# Patient Record
Sex: MALE | Race: WHITE | Employment: OTHER | URBAN - METROPOLITAN AREA
[De-identification: names, ages, dates, MRNs, and addresses within clinical notes are randomized per-mention and may not be internally consistent; named-entity substitution may affect disease eponyms.]

---

## 2018-09-28 RX ORDER — AMLODIPINE BESYLATE 5 MG/1
5 TABLET ORAL DAILY
COMMUNITY

## 2018-10-03 ENCOUNTER — ANESTHESIA EVENT (OUTPATIENT)
Dept: OPERATING ROOM | Age: 58
End: 2018-10-03
Payer: MEDICARE

## 2018-10-04 ENCOUNTER — ANESTHESIA (OUTPATIENT)
Dept: OPERATING ROOM | Age: 58
End: 2018-10-04
Payer: MEDICARE

## 2018-10-04 ENCOUNTER — HOSPITAL ENCOUNTER (OUTPATIENT)
Age: 58
Setting detail: OUTPATIENT SURGERY
Discharge: HOME | End: 2018-10-04
Attending: PODIATRIST | Admitting: PODIATRIST
Payer: MEDICARE

## 2018-10-04 VITALS
OXYGEN SATURATION: 99 % | WEIGHT: 225 LBS | HEART RATE: 58 BPM | BODY MASS INDEX: 28.88 KG/M2 | SYSTOLIC BLOOD PRESSURE: 135 MMHG | TEMPERATURE: 97.2 F | RESPIRATION RATE: 18 BRPM | DIASTOLIC BLOOD PRESSURE: 76 MMHG | HEIGHT: 74 IN

## 2018-10-04 VITALS — DIASTOLIC BLOOD PRESSURE: 75 MMHG | SYSTOLIC BLOOD PRESSURE: 120 MMHG | OXYGEN SATURATION: 98 %

## 2018-10-04 DIAGNOSIS — Z96.7 FIXATION HARDWARE IN LOWER EXTREMITY: Primary | ICD-10-CM

## 2018-10-04 LAB
GLUCOSE BLD-MCNC: 162 MG/DL (ref 70–99)
PERFORMED ON: ABNORMAL

## 2018-10-04 PROCEDURE — 3600000014 HC SURGERY LEVEL 4 ADDTL 15MIN: Performed by: PODIATRIST

## 2018-10-04 PROCEDURE — 3600000004 HC SURGERY LEVEL 4 BASE: Performed by: PODIATRIST

## 2018-10-04 PROCEDURE — 7100000000 HC PACU RECOVERY - FIRST 15 MIN: Performed by: PODIATRIST

## 2018-10-04 PROCEDURE — 2580000003 HC RX 258: Performed by: PODIATRIST

## 2018-10-04 PROCEDURE — 6360000002 HC RX W HCPCS: Performed by: NURSE ANESTHETIST, CERTIFIED REGISTERED

## 2018-10-04 PROCEDURE — 2580000003 HC RX 258: Performed by: ANESTHESIOLOGY

## 2018-10-04 PROCEDURE — 6360000002 HC RX W HCPCS: Performed by: PODIATRIST

## 2018-10-04 PROCEDURE — 3700000000 HC ANESTHESIA ATTENDED CARE: Performed by: PODIATRIST

## 2018-10-04 PROCEDURE — 7100000011 HC PHASE II RECOVERY - ADDTL 15 MIN: Performed by: PODIATRIST

## 2018-10-04 PROCEDURE — 3700000001 HC ADD 15 MINUTES (ANESTHESIA): Performed by: PODIATRIST

## 2018-10-04 PROCEDURE — 2709999900 HC NON-CHARGEABLE SUPPLY: Performed by: PODIATRIST

## 2018-10-04 PROCEDURE — 93005 ELECTROCARDIOGRAM TRACING: CPT

## 2018-10-04 PROCEDURE — 7100000010 HC PHASE II RECOVERY - FIRST 15 MIN: Performed by: PODIATRIST

## 2018-10-04 PROCEDURE — 2500000003 HC RX 250 WO HCPCS: Performed by: PODIATRIST

## 2018-10-04 RX ORDER — ONDANSETRON 2 MG/ML
4 INJECTION INTRAMUSCULAR; INTRAVENOUS
Status: DISCONTINUED | OUTPATIENT
Start: 2018-10-04 | End: 2018-10-04 | Stop reason: HOSPADM

## 2018-10-04 RX ORDER — MIDAZOLAM HYDROCHLORIDE 5 MG/ML
INJECTION INTRAMUSCULAR; INTRAVENOUS PRN
Status: DISCONTINUED | OUTPATIENT
Start: 2018-10-04 | End: 2018-10-04 | Stop reason: SDUPTHER

## 2018-10-04 RX ORDER — HYDRALAZINE HYDROCHLORIDE 20 MG/ML
5 INJECTION INTRAMUSCULAR; INTRAVENOUS EVERY 10 MIN PRN
Status: DISCONTINUED | OUTPATIENT
Start: 2018-10-04 | End: 2018-10-04 | Stop reason: HOSPADM

## 2018-10-04 RX ORDER — MORPHINE SULFATE 2 MG/ML
2 INJECTION, SOLUTION INTRAMUSCULAR; INTRAVENOUS EVERY 5 MIN PRN
Status: DISCONTINUED | OUTPATIENT
Start: 2018-10-04 | End: 2018-10-04 | Stop reason: HOSPADM

## 2018-10-04 RX ORDER — OXYCODONE HYDROCHLORIDE AND ACETAMINOPHEN 5; 325 MG/1; MG/1
2 TABLET ORAL PRN
Status: DISCONTINUED | OUTPATIENT
Start: 2018-10-04 | End: 2018-10-04 | Stop reason: HOSPADM

## 2018-10-04 RX ORDER — FENTANYL CITRATE 50 UG/ML
INJECTION, SOLUTION INTRAMUSCULAR; INTRAVENOUS PRN
Status: DISCONTINUED | OUTPATIENT
Start: 2018-10-04 | End: 2018-10-04 | Stop reason: SDUPTHER

## 2018-10-04 RX ORDER — OXYCODONE HYDROCHLORIDE AND ACETAMINOPHEN 5; 325 MG/1; MG/1
1 TABLET ORAL EVERY 6 HOURS PRN
Qty: 12 TABLET | Refills: 0 | Status: SHIPPED | OUTPATIENT
Start: 2018-10-04 | End: 2018-10-07

## 2018-10-04 RX ORDER — LIDOCAINE HYDROCHLORIDE 10 MG/ML
0.3 INJECTION, SOLUTION EPIDURAL; INFILTRATION; INTRACAUDAL; PERINEURAL
Status: DISCONTINUED | OUTPATIENT
Start: 2018-10-04 | End: 2018-10-04 | Stop reason: HOSPADM

## 2018-10-04 RX ORDER — LABETALOL HYDROCHLORIDE 5 MG/ML
5 INJECTION, SOLUTION INTRAVENOUS EVERY 10 MIN PRN
Status: DISCONTINUED | OUTPATIENT
Start: 2018-10-04 | End: 2018-10-04 | Stop reason: HOSPADM

## 2018-10-04 RX ORDER — MORPHINE SULFATE 2 MG/ML
1 INJECTION, SOLUTION INTRAMUSCULAR; INTRAVENOUS EVERY 5 MIN PRN
Status: DISCONTINUED | OUTPATIENT
Start: 2018-10-04 | End: 2018-10-04 | Stop reason: HOSPADM

## 2018-10-04 RX ORDER — PROPOFOL 10 MG/ML
INJECTION, EMULSION INTRAVENOUS PRN
Status: DISCONTINUED | OUTPATIENT
Start: 2018-10-04 | End: 2018-10-04 | Stop reason: SDUPTHER

## 2018-10-04 RX ORDER — OXYCODONE HYDROCHLORIDE AND ACETAMINOPHEN 5; 325 MG/1; MG/1
1 TABLET ORAL PRN
Status: DISCONTINUED | OUTPATIENT
Start: 2018-10-04 | End: 2018-10-04 | Stop reason: HOSPADM

## 2018-10-04 RX ORDER — DIPHENHYDRAMINE HYDROCHLORIDE 50 MG/ML
12.5 INJECTION INTRAMUSCULAR; INTRAVENOUS
Status: DISCONTINUED | OUTPATIENT
Start: 2018-10-04 | End: 2018-10-04 | Stop reason: HOSPADM

## 2018-10-04 RX ORDER — SODIUM CHLORIDE, SODIUM LACTATE, POTASSIUM CHLORIDE, CALCIUM CHLORIDE 600; 310; 30; 20 MG/100ML; MG/100ML; MG/100ML; MG/100ML
INJECTION, SOLUTION INTRAVENOUS CONTINUOUS
Status: DISCONTINUED | OUTPATIENT
Start: 2018-10-04 | End: 2018-10-04 | Stop reason: HOSPADM

## 2018-10-04 RX ORDER — PROMETHAZINE HYDROCHLORIDE 25 MG/ML
6.25 INJECTION, SOLUTION INTRAMUSCULAR; INTRAVENOUS
Status: DISCONTINUED | OUTPATIENT
Start: 2018-10-04 | End: 2018-10-04 | Stop reason: HOSPADM

## 2018-10-04 RX ORDER — MEPERIDINE HYDROCHLORIDE 50 MG/ML
12.5 INJECTION INTRAMUSCULAR; INTRAVENOUS; SUBCUTANEOUS EVERY 5 MIN PRN
Status: DISCONTINUED | OUTPATIENT
Start: 2018-10-04 | End: 2018-10-04 | Stop reason: HOSPADM

## 2018-10-04 RX ORDER — SODIUM CHLORIDE 0.9 % (FLUSH) 0.9 %
10 SYRINGE (ML) INJECTION PRN
Status: DISCONTINUED | OUTPATIENT
Start: 2018-10-04 | End: 2018-10-04 | Stop reason: HOSPADM

## 2018-10-04 RX ORDER — SODIUM CHLORIDE 0.9 % (FLUSH) 0.9 %
10 SYRINGE (ML) INJECTION EVERY 12 HOURS SCHEDULED
Status: DISCONTINUED | OUTPATIENT
Start: 2018-10-04 | End: 2018-10-04 | Stop reason: HOSPADM

## 2018-10-04 RX ORDER — MAGNESIUM HYDROXIDE 1200 MG/15ML
LIQUID ORAL CONTINUOUS PRN
Status: DISCONTINUED | OUTPATIENT
Start: 2018-10-04 | End: 2018-10-04 | Stop reason: HOSPADM

## 2018-10-04 RX ADMIN — SODIUM CHLORIDE, POTASSIUM CHLORIDE, SODIUM LACTATE AND CALCIUM CHLORIDE: 600; 310; 30; 20 INJECTION, SOLUTION INTRAVENOUS at 07:25

## 2018-10-04 RX ADMIN — FENTANYL CITRATE 25 MCG: 50 INJECTION INTRAMUSCULAR; INTRAVENOUS at 08:04

## 2018-10-04 RX ADMIN — Medication 1.5 G: at 07:25

## 2018-10-04 RX ADMIN — PROPOFOL 100 MG: 10 INJECTION, EMULSION INTRAVENOUS at 08:04

## 2018-10-04 RX ADMIN — MIDAZOLAM HYDROCHLORIDE 5 MG: 5 INJECTION INTRAMUSCULAR; INTRAVENOUS at 08:04

## 2018-10-04 ASSESSMENT — PAIN SCALES - GENERAL
PAINLEVEL_OUTOF10: 0
PAINLEVEL_OUTOF10: 0

## 2018-10-04 ASSESSMENT — PULMONARY FUNCTION TESTS
PIF_VALUE: 1
PIF_VALUE: 0
PIF_VALUE: 1
PIF_VALUE: 0
PIF_VALUE: 0
PIF_VALUE: 2
PIF_VALUE: 0
PIF_VALUE: 2
PIF_VALUE: 1
PIF_VALUE: 1
PIF_VALUE: 0
PIF_VALUE: 1
PIF_VALUE: 0
PIF_VALUE: 1
PIF_VALUE: 1
PIF_VALUE: 0
PIF_VALUE: 2
PIF_VALUE: 0
PIF_VALUE: 1
PIF_VALUE: 0

## 2018-10-04 ASSESSMENT — PAIN - FUNCTIONAL ASSESSMENT: PAIN_FUNCTIONAL_ASSESSMENT: 0-10

## 2018-10-04 NOTE — ANESTHESIA PRE PROCEDURE
 Lisinopril Itching    Penicillins Rash       Problem List:    Patient Active Problem List   Diagnosis Code    Acute renal failure (HCC) N17.9    Altered mental status R41.82    Calculus of kidney N20.0    Skin sensation disturbance R20.9    Lumbosacral radiculopathy M54.17    Pain in extremity M79.609    Tarsal tunnel syndrome G57.50    Pain in testicle N50.819    Hereditary and idiopathic peripheral neuropathy G60.9    Gastrointestinal hemorrhage K92.2    Hyperglycemia R73.9    Elevation of level of transaminase or lactic acid dehydrogenase (LDH) R74.0       Past Medical History:        Diagnosis Date    [facility]     [facility]-fib [facility]     Arthritis     CAD (coronary artery disease)     a fib    [facility]     Diverticulosis     Hypertension     Neuropathy     feet       Past Surgical History:        Procedure Laterality Date    CHOLECYSTECTOMY      FOOT SURGERY      HAND SURGERY      KNEE ARTHROSCOPY      NOSE SURGERY      SHOULDER ARTHROSCOPY Left 3/21/16    SAD, Debridement, GASTON, Open BT       Social History:    Social History   Substance Use Topics    Smoking status: Never Smoker    Smokeless tobacco: Never Used    Alcohol use 14.4 oz/week     24 Cans of beer per week                                Counseling given: Not Answered      Vital Signs (Current):   Vitals:    09/28/18 0923 10/04/18 0713   BP:  (!) 157/94   Pulse:  62   Resp:  18   Temp:  97.3 °F (36.3 °C)   TempSrc:  Temporal   SpO2:  98%   Weight: 225 lb (102.1 kg) 225 lb (102.1 kg)   Height: 6' 2\" (1.88 m) 6' 2\" (1.88 m)                                              BP Readings from Last 3 Encounters:   10/04/18 (!) 157/94   06/16/16 133/85   05/05/16 102/78       NPO Status:                                                                                 BMI:   Wt Readings from Last 3 Encounters:   10/04/18 225 lb (102.1 kg)   06/16/16 225 lb (102.1 kg)   05/05/16 225 lb (102.1 kg)     Body mass index is 28.89 kg/m². CBC: No results found for: WBC, RBC, HGB, HCT, MCV, RDW, PLT    CMP:   Lab Results   Component Value Date    NA CANCEL 06/22/2015    K CANCEL 06/22/2015    CL CANCEL 06/22/2015    CO2 CANCEL 06/22/2015    BUN CANCEL 06/22/2015    CREATININE CANCEL 06/22/2015    GFRAA CANCEL 06/22/2015    GLUCOSE 159 06/22/2015    CALCIUM CANCEL 06/22/2015       POC Tests: No results for input(s): POCGLU, POCNA, POCK, POCCL, POCBUN, POCHEMO, POCHCT in the last 72 hours. Coags:   Lab Results   Component Value Date    PROTIME CANCEL 06/22/2015    INR CANCEL 06/22/2015       HCG (If Applicable): No results found for: PREGTESTUR, PREGSERUM, HCG, HCGQUANT     ABGs: No results found for: PHART, PO2ART, GQV8VZE, VPN3ABD, BEART, B5VRSFRD     Type & Screen (If Applicable):  No results found for: LABABO, LABRH    Anesthesia Evaluation   no history of anesthetic complications:   Airway: Mallampati: III  TM distance: <3 FB   Neck ROM: limited  Mouth opening: > = 3 FB Dental:          Pulmonary:Negative Pulmonary ROS                              Cardiovascular:    (+) hypertension:, CAD:, dysrhythmias: atrial fibrillation,          Beta Blocker:  Dose within 24 Hrs         Neuro/Psych:   (+) neuromuscular disease:,              ROS comment: PERIPHERAL NEUROPATHY GI/Hepatic/Renal:   (+) renal disease: kidney stones,           Endo/Other:    (+) DiabetesType II DM, , : arthritis: OA., .                 Abdominal:           Vascular:                                     Pre-Operative Diagnosis: Displacement of internal fixation device of bone of right lower leg, subsequent encounter [T84.126D]    59 y.o.   BMI:  Body mass index is 28.89 kg/m².      Vitals:    09/28/18 0923 10/04/18 0713   BP:  (!) 157/94   Pulse:  62   Resp:  18   Temp:  97.3 °F (36.3 °C)   TempSrc:  Temporal   SpO2:  98%   Weight: 225 lb (102.1 kg) 225 lb (102.1 kg)   Height: 6' 2\" (1.88 m) 6' 2\" (1.88 m)       Allergies   Allergen Reactions    Lisinopril Itching

## 2018-10-05 LAB
EKG ATRIAL RATE: 326 BPM
EKG DIAGNOSIS: NORMAL
EKG Q-T INTERVAL: 410 MS
EKG QRS DURATION: 78 MS
EKG QTC CALCULATION (BAZETT): 429 MS
EKG R AXIS: -4 DEGREES
EKG T AXIS: 2 DEGREES
EKG VENTRICULAR RATE: 66 BPM

## 2018-12-17 NOTE — PROGRESS NOTES
Armani Kayley    Age 59 y.o.    male    1960    MRN [id number]    Date___________   Arrival Time_____________  OR Time____________Duration____     Procedure(s):  REMOVAL OF  SCREW  RIGHT HALLUX  PROXIMAL PHALANX OSTEOTOMY WITHOUT FIXATION RIGHT FOOT    Surgeon  ________________________________  [facility]   General   [place]       Phone [phone] (home)       Initals  Date  [address]  Cell Work  ______________________________________________________________________________________________________________________________________________________________________________________________________________________________________________________________________________________________________________________________________________________________    PCP__________________________Phone__________________________________      H&P__________________Bringing      Chart              Epic    DOS           Called_______  EKG__________________Bringing      Chart              Epic    DOS           Called_______  LAB__________________ Bringing      Chart              Epic    DOS           Called_______  CardiacClearance _______Bringing      Chart              Epic    DOS           Called_______      Cardiologist________________________ Phone___________________________      ? Zoroastrianism concerns / Waiver on Chart            [employer]________________  ? Pre-op Instructions Given [place]          _________________________________  ? Directions to Surgery Center                          _________________________________  ? Transportation Home_______________      _________________________________  ?  Crutches/Walker__________________        _________________________________      ________Pre-op Orders   _______Transcribed    _______Wt.  ________Pharmacy          _______SCD  ______VTE     ______Beta Blocker  ________Consent

## 2018-12-26 ENCOUNTER — ANESTHESIA EVENT (OUTPATIENT)
Dept: OPERATING ROOM | Age: 58
End: 2018-12-26
Payer: MEDICARE

## 2018-12-27 ENCOUNTER — HOSPITAL ENCOUNTER (OUTPATIENT)
Age: 58
Setting detail: OUTPATIENT SURGERY
Discharge: HOME | End: 2018-12-27
Attending: PODIATRIST | Admitting: PODIATRIST
Payer: MEDICARE

## 2018-12-27 ENCOUNTER — ANESTHESIA (OUTPATIENT)
Dept: OPERATING ROOM | Age: 58
End: 2018-12-27
Payer: MEDICARE

## 2018-12-27 VITALS — SYSTOLIC BLOOD PRESSURE: 138 MMHG | OXYGEN SATURATION: 96 % | DIASTOLIC BLOOD PRESSURE: 99 MMHG

## 2018-12-27 VITALS
DIASTOLIC BLOOD PRESSURE: 93 MMHG | HEART RATE: 72 BPM | TEMPERATURE: 97.2 F | RESPIRATION RATE: 14 BRPM | WEIGHT: 225 LBS | BODY MASS INDEX: 28.88 KG/M2 | SYSTOLIC BLOOD PRESSURE: 127 MMHG | OXYGEN SATURATION: 96 % | HEIGHT: 74 IN

## 2018-12-27 DIAGNOSIS — M25.774 OSTEOPHYTE OF RIGHT FOOT: Primary | ICD-10-CM

## 2018-12-27 LAB
EKG ATRIAL RATE: 66 BPM
EKG DIAGNOSIS: NORMAL
EKG Q-T INTERVAL: 412 MS
EKG QRS DURATION: 82 MS
EKG QTC CALCULATION (BAZETT): 438 MS
EKG R AXIS: 1 DEGREES
EKG T AXIS: -6 DEGREES
EKG VENTRICULAR RATE: 68 BPM
GLUCOSE BLD-MCNC: 235 MG/DL (ref 70–99)
GLUCOSE BLD-MCNC: 288 MG/DL (ref 70–99)
PERFORMED ON: ABNORMAL
PERFORMED ON: ABNORMAL

## 2018-12-27 PROCEDURE — 6360000002 HC RX W HCPCS: Performed by: NURSE ANESTHETIST, CERTIFIED REGISTERED

## 2018-12-27 PROCEDURE — 2580000003 HC RX 258: Performed by: ANESTHESIOLOGY

## 2018-12-27 PROCEDURE — 7100000011 HC PHASE II RECOVERY - ADDTL 15 MIN: Performed by: PODIATRIST

## 2018-12-27 PROCEDURE — 93010 ELECTROCARDIOGRAM REPORT: CPT | Performed by: INTERNAL MEDICINE

## 2018-12-27 PROCEDURE — 3700000000 HC ANESTHESIA ATTENDED CARE: Performed by: PODIATRIST

## 2018-12-27 PROCEDURE — 6360000002 HC RX W HCPCS: Performed by: PODIATRIST

## 2018-12-27 PROCEDURE — 2500000003 HC RX 250 WO HCPCS: Performed by: NURSE ANESTHETIST, CERTIFIED REGISTERED

## 2018-12-27 PROCEDURE — 2709999900 HC NON-CHARGEABLE SUPPLY: Performed by: PODIATRIST

## 2018-12-27 PROCEDURE — 7100000010 HC PHASE II RECOVERY - FIRST 15 MIN: Performed by: PODIATRIST

## 2018-12-27 PROCEDURE — 93005 ELECTROCARDIOGRAM TRACING: CPT | Performed by: ANESTHESIOLOGY

## 2018-12-27 PROCEDURE — 3600000004 HC SURGERY LEVEL 4 BASE: Performed by: PODIATRIST

## 2018-12-27 PROCEDURE — 3700000001 HC ADD 15 MINUTES (ANESTHESIA): Performed by: PODIATRIST

## 2018-12-27 PROCEDURE — 3600000014 HC SURGERY LEVEL 4 ADDTL 15MIN: Performed by: PODIATRIST

## 2018-12-27 RX ORDER — MIDAZOLAM HYDROCHLORIDE 1 MG/ML
INJECTION INTRAMUSCULAR; INTRAVENOUS PRN
Status: DISCONTINUED | OUTPATIENT
Start: 2018-12-27 | End: 2018-12-27 | Stop reason: SDUPTHER

## 2018-12-27 RX ORDER — FENTANYL CITRATE 50 UG/ML
INJECTION, SOLUTION INTRAMUSCULAR; INTRAVENOUS PRN
Status: DISCONTINUED | OUTPATIENT
Start: 2018-12-27 | End: 2018-12-27 | Stop reason: SDUPTHER

## 2018-12-27 RX ORDER — LIDOCAINE HYDROCHLORIDE 10 MG/ML
1 INJECTION, SOLUTION EPIDURAL; INFILTRATION; INTRACAUDAL; PERINEURAL
Status: DISCONTINUED | OUTPATIENT
Start: 2018-12-27 | End: 2018-12-27 | Stop reason: HOSPADM

## 2018-12-27 RX ORDER — SODIUM CHLORIDE, SODIUM LACTATE, POTASSIUM CHLORIDE, CALCIUM CHLORIDE 600; 310; 30; 20 MG/100ML; MG/100ML; MG/100ML; MG/100ML
INJECTION, SOLUTION INTRAVENOUS CONTINUOUS
Status: DISCONTINUED | OUTPATIENT
Start: 2018-12-27 | End: 2018-12-27 | Stop reason: HOSPADM

## 2018-12-27 RX ORDER — LIDOCAINE HYDROCHLORIDE 20 MG/ML
INJECTION, SOLUTION INFILTRATION; PERINEURAL PRN
Status: DISCONTINUED | OUTPATIENT
Start: 2018-12-27 | End: 2018-12-27 | Stop reason: SDUPTHER

## 2018-12-27 RX ORDER — PROPOFOL 10 MG/ML
INJECTION, EMULSION INTRAVENOUS PRN
Status: DISCONTINUED | OUTPATIENT
Start: 2018-12-27 | End: 2018-12-27 | Stop reason: SDUPTHER

## 2018-12-27 RX ORDER — OXYCODONE HYDROCHLORIDE AND ACETAMINOPHEN 5; 325 MG/1; MG/1
1 TABLET ORAL EVERY 6 HOURS PRN
Qty: 20 TABLET | Refills: 0 | Status: SHIPPED | OUTPATIENT
Start: 2018-12-27 | End: 2019-01-01

## 2018-12-27 RX ORDER — SODIUM CHLORIDE 0.9 % (FLUSH) 0.9 %
10 SYRINGE (ML) INJECTION PRN
Status: DISCONTINUED | OUTPATIENT
Start: 2018-12-27 | End: 2018-12-27 | Stop reason: HOSPADM

## 2018-12-27 RX ORDER — SODIUM CHLORIDE 0.9 % (FLUSH) 0.9 %
10 SYRINGE (ML) INJECTION EVERY 12 HOURS SCHEDULED
Status: DISCONTINUED | OUTPATIENT
Start: 2018-12-27 | End: 2018-12-27 | Stop reason: HOSPADM

## 2018-12-27 RX ADMIN — MIDAZOLAM HYDROCHLORIDE 2 MG: 2 INJECTION, SOLUTION INTRAMUSCULAR; INTRAVENOUS at 09:43

## 2018-12-27 RX ADMIN — PROPOFOL 30 MG: 10 INJECTION, EMULSION INTRAVENOUS at 10:20

## 2018-12-27 RX ADMIN — FENTANYL CITRATE 100 MCG: 50 INJECTION INTRAMUSCULAR; INTRAVENOUS at 09:43

## 2018-12-27 RX ADMIN — PROPOFOL 20 MG: 10 INJECTION, EMULSION INTRAVENOUS at 10:30

## 2018-12-27 RX ADMIN — PROPOFOL 30 MG: 10 INJECTION, EMULSION INTRAVENOUS at 10:07

## 2018-12-27 RX ADMIN — Medication 1.5 G: at 09:42

## 2018-12-27 RX ADMIN — Medication 1.5 G: at 08:29

## 2018-12-27 RX ADMIN — SODIUM CHLORIDE, POTASSIUM CHLORIDE, SODIUM LACTATE AND CALCIUM CHLORIDE: 600; 310; 30; 20 INJECTION, SOLUTION INTRAVENOUS at 08:32

## 2018-12-27 RX ADMIN — LIDOCAINE HYDROCHLORIDE 50 MG: 20 INJECTION, SOLUTION INFILTRATION; PERINEURAL at 09:46

## 2018-12-27 RX ADMIN — PROPOFOL 100 MG: 10 INJECTION, EMULSION INTRAVENOUS at 09:46

## 2018-12-27 RX ADMIN — MIDAZOLAM HYDROCHLORIDE 2 MG: 2 INJECTION, SOLUTION INTRAMUSCULAR; INTRAVENOUS at 10:34

## 2018-12-27 RX ADMIN — PROPOFOL 20 MG: 10 INJECTION, EMULSION INTRAVENOUS at 10:16

## 2018-12-27 RX ADMIN — FENTANYL CITRATE 100 MCG: 50 INJECTION INTRAMUSCULAR; INTRAVENOUS at 10:34

## 2018-12-27 ASSESSMENT — PAIN SCALES - GENERAL
PAINLEVEL_OUTOF10: 0

## 2018-12-27 ASSESSMENT — PULMONARY FUNCTION TESTS
PIF_VALUE: 0
PIF_VALUE: 1
PIF_VALUE: 0
PIF_VALUE: 1
PIF_VALUE: 0
PIF_VALUE: 1
PIF_VALUE: 0
PIF_VALUE: 1
PIF_VALUE: 0
PIF_VALUE: 1
PIF_VALUE: 0
PIF_VALUE: 0

## 2018-12-27 ASSESSMENT — PAIN - FUNCTIONAL ASSESSMENT: PAIN_FUNCTIONAL_ASSESSMENT: 0-10

## 2018-12-27 NOTE — ANESTHESIA PRE PROCEDURE
Department of Anesthesiology  Preprocedure Note       Name:  Teresa Bardales   Age:  59 y.o.  :  1960                                          MRN:  [id number]         Date:  2018      Surgeon: Margaret Edmondson):  Reema Olmstead DPM    Procedure: REMOVAL OF  SCREW  RIGHT HALLUX (Right Foot)  PROXIMAL PHALANX OSTECTOMY WITHOUT FIXATION RIGHT FOOT (Right Foot)    Medications prior to admission:   Prior to Admission medications    Medication Sig Start Date End Date Taking? Authorizing Provider   oxyCODONE-acetaminophen (PERCOCET) 5-325 MG per tablet Take 1 tablet by mouth every 6 hours as needed for Pain for up to 5 days. Intended supply: 5 days. Take lowest dose possible to manage pain. Earliest Fill Date: 18 Yes Reema Olmstead DPM   DULoxetine (CYMBALTA) 60 MG capsule Take 120 mg by mouth daily    Yes Historical Provider, MD   gabapentin (NEURONTIN) 800 MG tablet Take 800 mg by mouth 4 times daily.  .   Yes Historical Provider, MD   atenolol (TENORMIN) 50 MG tablet Take 50 mg by mouth daily    Yes Historical Provider, MD   sitaGLIPtin (JANUVIA) 50 MG tablet Take 100 mg by mouth daily    Yes Historical Provider, MD   amLODIPine (NORVASC) 5 MG tablet Take 5 mg by mouth daily    Historical Provider, MD   apixaban (ELIQUIS) 5 MG TABS tablet Take by mouth 2 times daily    Historical Provider, MD   cyclobenzaprine (FLEXERIL) 5 MG tablet Take 5 mg by mouth as needed     Historical Provider, MD       Current medications:    Current Facility-Administered Medications   Medication Dose Route Frequency Provider Last Rate Last Dose    lactated ringers infusion   Intravenous Continuous Lisandro Kaur  mL/hr at 18 7443      sodium chloride flush 0.9 % injection 10 mL  10 mL Intravenous 2 times per day Lisandro Kaur MD        sodium chloride flush 0.9 % injection 10 mL  10 mL Intravenous PRN Lisandro Kaur MD        lidocaine PF 1 % injection 1 mL  1 mL Intradermal Once PRN Keren Liriano MD        bupivacaine (MARCAINE) 5 mL, lidocaine PF 1 % 5 mL    PRN Zoey Cardenas DPM   10 mL at 12/27/18 1006       Allergies: Allergies   Allergen Reactions    Amlodipine      Leg swelling    Lisinopril Itching     Rash?  Tamsulosin Itching     Itchy rash.     Penicillins Rash       Problem List:    Patient Active Problem List   Diagnosis Code    Acute renal failure (HCC) N17.9    Altered mental status R41.82    Calculus of kidney N20.0    Skin sensation disturbance R20.9    Lumbosacral radiculopathy M54.17    Pain in extremity M79.609    Tarsal tunnel syndrome G57.50    Pain in testicle N50.819    Hereditary and idiopathic peripheral neuropathy G60.9    Gastrointestinal hemorrhage K92.2    Hyperglycemia R73.9    Elevation of level of transaminase or lactic acid dehydrogenase (LDH) R74.0       Past Medical History:        Diagnosis Date    [facility]     Arthritis     CAD (coronary artery disease)     a fib    Diabetes mellitus ([facility]     Diverticulosis     Hypertension     Neuropathy     feet    Sleep apnea     uses CPAP       Past Surgical History:        Procedure Laterality Date    CHOLECYSTECTOMY      FOOT SURGERY Right     X 3    HAND SURGERY      KNEE ARTHROSCOPY      NOSE SURGERY      WI REMOVAL DEEP IMPLANT Right 10/4/2018    FOOT HARDWARE REMOVAL RIGHT HALLUX, 2 SCREWS--SLEEP APNEA performed by Zoey Cardenas DPM at [place] [address] ARTHROSCOPY Left 3/21/16    SAD, Debridement, GASTON, Open BT       Social History:    Social History   Substance Use Topics    Smoking status: Never Smoker    Smokeless tobacco: Never Used    Alcohol use 14.4 oz/week     24 Cans of beer per week                                Counseling given: Not Answered      Vital Signs (Current):   Vitals:    12/27/18 1055 12/27/18 1100 12/27/18 1105 12/27/18 1110   BP: (!) 140/121 127/87 (!) 144/82 134/81   Pulse: 73 70 70 62   Resp: 14 15 15 14   Temp:  97.2 °F (36.2 °C)

## 2021-07-19 ENCOUNTER — HOSPITAL ENCOUNTER (OUTPATIENT)
Age: 61
Discharge: HOME | End: 2021-07-19
Payer: MEDICARE

## 2021-07-19 ENCOUNTER — HOSPITAL ENCOUNTER (OUTPATIENT)
Dept: VASCULAR LAB | Age: 61
Discharge: HOME | End: 2021-07-19
Payer: MEDICARE

## 2021-07-19 DIAGNOSIS — I70.25 ATHEROSCLEROSIS OF NATIVE ARTERY OF EXTREMITY WITH ULCERATION, UNSPECIFIED EXTREMITY (HCC): ICD-10-CM

## 2021-07-19 LAB
BASOPHILS ABSOLUTE: 0 K/UL (ref 0–0.2)
BASOPHILS RELATIVE PERCENT: 0.5 %
C-REACTIVE PROTEIN: 3.9 MG/L (ref 0–5.1)
EOSINOPHILS ABSOLUTE: 0.2 K/UL (ref 0–0.6)
EOSINOPHILS RELATIVE PERCENT: 4.2 %
HCT VFR BLD CALC: 40.5 % (ref 40.5–52.5)
HEMOGLOBIN: 14.2 G/DL (ref 13.5–17.5)
LYMPHOCYTES ABSOLUTE: 1.3 K/UL (ref 1–5.1)
LYMPHOCYTES RELATIVE PERCENT: 22 %
MCH RBC QN AUTO: 33.5 PG (ref 26–34)
MCHC RBC AUTO-ENTMCNC: 35.1 G/DL (ref 31–36)
MCV RBC AUTO: 95.5 FL (ref 80–100)
MONOCYTES ABSOLUTE: 0.6 K/UL (ref 0–1.3)
MONOCYTES RELATIVE PERCENT: 10.6 %
NEUTROPHILS ABSOLUTE: 3.7 K/UL (ref 1.7–7.7)
NEUTROPHILS RELATIVE PERCENT: 62.7 %
PDW BLD-RTO: 14.3 % (ref 12.4–15.4)
PLATELET # BLD: 161 K/UL (ref 135–450)
PMV BLD AUTO: 8.6 FL (ref 5–10.5)
RBC # BLD: 4.24 M/UL (ref 4.2–5.9)
SEDIMENTATION RATE, ERYTHROCYTE: 16 MM/HR (ref 0–20)
WBC # BLD: 5.9 K/UL (ref 4–11)

## 2021-07-19 PROCEDURE — 85652 RBC SED RATE AUTOMATED: CPT

## 2021-07-19 PROCEDURE — 93925 LOWER EXTREMITY STUDY: CPT

## 2021-07-19 PROCEDURE — 86140 C-REACTIVE PROTEIN: CPT

## 2021-07-19 PROCEDURE — 85025 COMPLETE CBC W/AUTO DIFF WBC: CPT

## 2021-07-19 PROCEDURE — 36415 COLL VENOUS BLD VENIPUNCTURE: CPT

## 2023-09-28 ENCOUNTER — OFFICE VISIT (OUTPATIENT)
Dept: PAIN MANAGEMENT | Age: 63
End: 2023-09-28
Payer: MEDICARE

## 2023-09-28 VITALS
WEIGHT: 212 LBS | SYSTOLIC BLOOD PRESSURE: 138 MMHG | DIASTOLIC BLOOD PRESSURE: 81 MMHG | OXYGEN SATURATION: 100 % | HEART RATE: 40 BPM | BODY MASS INDEX: 27.22 KG/M2

## 2023-09-28 DIAGNOSIS — M17.0 PRIMARY OSTEOARTHRITIS OF BOTH KNEES: ICD-10-CM

## 2023-09-28 DIAGNOSIS — M96.1 FAILED BACK SURGICAL SYNDROME: ICD-10-CM

## 2023-09-28 DIAGNOSIS — M79.671 PAIN IN RIGHT FOOT: ICD-10-CM

## 2023-09-28 DIAGNOSIS — F19.10 SUBSTANCE ABUSE (HCC): ICD-10-CM

## 2023-09-28 DIAGNOSIS — F51.01 PRIMARY INSOMNIA: ICD-10-CM

## 2023-09-28 DIAGNOSIS — M79.18 MYOFASCIAL PAIN: ICD-10-CM

## 2023-09-28 DIAGNOSIS — G89.4 CHRONIC PAIN SYNDROME: ICD-10-CM

## 2023-09-28 PROCEDURE — G8427 DOCREV CUR MEDS BY ELIG CLIN: HCPCS | Performed by: INTERNAL MEDICINE

## 2023-09-28 PROCEDURE — 3017F COLORECTAL CA SCREEN DOC REV: CPT | Performed by: INTERNAL MEDICINE

## 2023-09-28 PROCEDURE — 1123F ACP DISCUSS/DSCN MKR DOCD: CPT | Performed by: INTERNAL MEDICINE

## 2023-09-28 PROCEDURE — 99204 OFFICE O/P NEW MOD 45 MIN: CPT | Performed by: INTERNAL MEDICINE

## 2023-09-28 PROCEDURE — 4004F PT TOBACCO SCREEN RCVD TLK: CPT | Performed by: INTERNAL MEDICINE

## 2023-09-28 PROCEDURE — G8419 CALC BMI OUT NRM PARAM NOF/U: HCPCS | Performed by: INTERNAL MEDICINE

## 2023-09-28 RX ORDER — VALSARTAN 320 MG/1
320 TABLET ORAL DAILY
COMMUNITY

## 2023-09-28 RX ORDER — ATORVASTATIN CALCIUM 20 MG/1
20 TABLET, FILM COATED ORAL DAILY
COMMUNITY

## 2023-09-28 RX ORDER — CYCLOSPORINE 0.5 MG/ML
1 EMULSION OPHTHALMIC 2 TIMES DAILY
COMMUNITY

## 2024-01-25 ENCOUNTER — OFFICE VISIT (OUTPATIENT)
Dept: ORTHOPEDIC SURGERY | Age: 64
End: 2024-01-25
Payer: MEDICARE

## 2024-01-25 VITALS — BODY MASS INDEX: 27.22 KG/M2 | HEIGHT: 74 IN

## 2024-01-25 DIAGNOSIS — M19.012 PRIMARY OSTEOARTHRITIS OF LEFT SHOULDER: ICD-10-CM

## 2024-01-25 DIAGNOSIS — M19.019 AC JOINT ARTHROPATHY: ICD-10-CM

## 2024-01-25 DIAGNOSIS — M25.512 LEFT SHOULDER PAIN, UNSPECIFIED CHRONICITY: Primary | ICD-10-CM

## 2024-01-25 PROCEDURE — G8484 FLU IMMUNIZE NO ADMIN: HCPCS | Performed by: ORTHOPAEDIC SURGERY

## 2024-01-25 PROCEDURE — 1123F ACP DISCUSS/DSCN MKR DOCD: CPT | Performed by: ORTHOPAEDIC SURGERY

## 2024-01-25 PROCEDURE — 99203 OFFICE O/P NEW LOW 30 MIN: CPT | Performed by: ORTHOPAEDIC SURGERY

## 2024-01-25 PROCEDURE — 20610 DRAIN/INJ JOINT/BURSA W/O US: CPT | Performed by: ORTHOPAEDIC SURGERY

## 2024-01-25 PROCEDURE — G8428 CUR MEDS NOT DOCUMENT: HCPCS | Performed by: ORTHOPAEDIC SURGERY

## 2024-01-25 PROCEDURE — G8419 CALC BMI OUT NRM PARAM NOF/U: HCPCS | Performed by: ORTHOPAEDIC SURGERY

## 2024-01-25 PROCEDURE — 3017F COLORECTAL CA SCREEN DOC REV: CPT | Performed by: ORTHOPAEDIC SURGERY

## 2024-01-25 PROCEDURE — 1036F TOBACCO NON-USER: CPT | Performed by: ORTHOPAEDIC SURGERY

## 2024-01-25 RX ORDER — TRAMADOL HYDROCHLORIDE 50 MG/1
50 TABLET ORAL NIGHTLY PRN
Qty: 30 TABLET | Refills: 0 | Status: SHIPPED | OUTPATIENT
Start: 2024-01-25 | End: 2024-02-24

## 2024-01-25 RX ORDER — VALSARTAN AND HYDROCHLOROTHIAZIDE 320; 25 MG/1; MG/1
1 TABLET, FILM COATED ORAL DAILY
COMMUNITY
Start: 2020-09-23

## 2024-01-25 RX ORDER — LIDOCAINE HYDROCHLORIDE 10 MG/ML
8 INJECTION, SOLUTION INFILTRATION; PERINEURAL ONCE
Status: COMPLETED | OUTPATIENT
Start: 2024-01-25 | End: 2024-01-25

## 2024-01-25 RX ORDER — METHYLPREDNISOLONE ACETATE 40 MG/ML
80 INJECTION, SUSPENSION INTRA-ARTICULAR; INTRALESIONAL; INTRAMUSCULAR; SOFT TISSUE ONCE
Status: COMPLETED | OUTPATIENT
Start: 2024-01-25 | End: 2024-01-25

## 2024-01-25 RX ORDER — METFORMIN HYDROCHLORIDE 500 MG/1
1000 TABLET, EXTENDED RELEASE ORAL EVERY MORNING
COMMUNITY

## 2024-01-25 RX ORDER — SEMAGLUTIDE 1.34 MG/ML
INJECTION, SOLUTION SUBCUTANEOUS
COMMUNITY
Start: 2023-09-05

## 2024-01-25 RX ADMIN — METHYLPREDNISOLONE ACETATE 80 MG: 40 INJECTION, SUSPENSION INTRA-ARTICULAR; INTRALESIONAL; INTRAMUSCULAR; SOFT TISSUE at 13:38

## 2024-01-25 RX ADMIN — LIDOCAINE HYDROCHLORIDE 8 ML: 10 INJECTION, SOLUTION INFILTRATION; PERINEURAL at 13:36

## 2024-01-25 NOTE — PROGRESS NOTES
[facility]  History and Physical  Shoulder Pain    Date:  2024    Name:  Facundo Hartley  Address:  [address] [place] [address]  [place] [address]    :  1960      Age:   63 y.o.    SSN:  [id number]      Medical Record Number:  [id number]    Reason for Visit:    Shoulder Pain (OP/NP LEFT SHOULDER)      HPI:   Facundo Hartley is a 63 y.o. male who presents to our office today complaining of  left shoulder pain.  He is an old patient of ours but has not been seen since 2016.  He is a retired  who had a left arthroscopic subacromial decompression with open biceps tenodesis on 3/21/2016.  This patient has done really well following his surgery and was able to return back to his former job.  More recently, 3 months ago he began having increasing pain in his left shoulder.  He does have pain that goes down his arm.  He denies any specific injuries to the shoulder.  He is quite active and continues to work on things around the house as well as going hunting.    Pain Assessment  Location of Pain: Shoulder  Location Modifiers: Left  Severity of Pain: 8  Quality of Pain: Aching, Dull, Sharp, Throbbing  Duration of Pain: Persistent  Frequency of Pain: Constant  Aggravating Factors: Other (Comment), Straightening, Stretching, Exercise  Limiting Behavior: Yes  Work-Related Injury: No  Are there other pain locations you wish to document?: No    Review of Systems:  A 14 point review of systems available in the scanned medical record as documented by the patient.  The review is negative with the exception of those things mentioned in the History of Present Illness and Past Medical History.      Past History:  Past Medical History:   Diagnosis Date    A-fib (HCC)     Arthritis     CAD (coronary artery disease)     a fib    Diabetes mellitus (HCC)     Diverticulosis     Hypertension     Neuropathy     feet    Sleep apnea     uses CPAP     Past Surgical History:

## 2024-01-29 ENCOUNTER — EVALUATION (OUTPATIENT)
Dept: PHYSICAL THERAPY | Age: 64
End: 2024-01-29

## 2024-01-29 DIAGNOSIS — M25.512 LEFT SHOULDER PAIN, UNSPECIFIED CHRONICITY: Primary | ICD-10-CM

## 2024-01-29 DIAGNOSIS — M19.012 PRIMARY OSTEOARTHRITIS, LEFT SHOULDER: ICD-10-CM

## 2024-01-29 PROCEDURE — G8427 DOCREV CUR MEDS BY ELIG CLIN: HCPCS

## 2024-01-29 PROCEDURE — 1100F PTFALLS ASSESS-DOCD GE2>/YR: CPT

## 2024-01-29 NOTE — PROGRESS NOTES
[facility]            [address] [place] [address]              P:[phone]  F:[phone]      Physical Therapy: TREATMENT/PROGRESS NOTE   Patient: Facundo Hartley (63 y.o. male)   Treatment Date: 2024   :  1960 MRN: [id number]   Visit #: 1   Insurance Allowable Auth Needed   TBD [x]Yes    []No    Insurance: Payor: HUMANA MEDICARE / Plan: HUMANA CHOICE-PPO MEDICARE / Product Type: *No Product type* /   Insurance ID: [id number] - (Medicare Managed)  Secondary Insurance (if applicable):    Treatment Diagnosis:     ICD-10-CM    1. Left shoulder pain, unspecified chronicity  M25.512       2. Primary osteoarthritis, left shoulder  M19.012          Medical Diagnosis:    No admission diagnoses are documented for this encounter.   Referring Physician: Wally Archibald MD  PCP: Juan Peña MD                             Plan of care signed: NO    Date of Patient follow up with Physician: 2024     Progress Report/POC: EVAL today  POC update due: (10 visits /OR AUTH LIMITS, whichever is less)  2024     L shoulder pain, hx of SAD and biceps tenodesis    Preferred Language for Healthcare:   [x]English       []other:    SUBJECTIVE EXAMINATION     Patient Report/Comments: see eval     Test used Initial score  2024   Pain Summary VAS 7-8/10    Functional questionnaire Quick DASH 59.09% limitation    Other:                OBJECTIVE EXAMINATION     Observation: patient has limited L hand mobility secondary to other injuries impacting  for exercise; see eval    Test measurements: see eval    Exercises/Interventions:   Therapeutic Ex (14359) Sets/sec Reps Notes/CUES   AD UE BIKE            STRETCHING/ROM      Pulleys      Table Slides-Flexion      Table Slides-Scaption      Table Slides-Abduction 10\" x10    Walk back stretch at counter 10\" x10    Wand-ER at 0      Wand-Supine ER at 45      Wand-Supine Flexion      UE [place]    
activity modification, progression of HEP.        [] Aquatic Therapy     HEP instruction: Refer to HEP instructions outlined on the flowsheet on 01/29/2024. Access Code: [email or web address]     Electronically Signed by Umair Reyes, [facility]  Date: 01/29/2024     Umair Reyes, Student Physical Therapist      Therapist was present, directed the patient's care, made skilled judgement, and was responsible for assessment and treatment of the patient.            Board-Certified Orthopaedic Clinical Specialist    [place] PT license: [id number]  [place] PT license: [id number]

## 2024-02-06 ENCOUNTER — TREATMENT (OUTPATIENT)
Dept: PHYSICAL THERAPY | Age: 64
End: 2024-02-06

## 2024-02-06 DIAGNOSIS — M19.012 PRIMARY OSTEOARTHRITIS, LEFT SHOULDER: ICD-10-CM

## 2024-02-06 DIAGNOSIS — M25.512 LEFT SHOULDER PAIN, UNSPECIFIED CHRONICITY: Primary | ICD-10-CM

## 2024-02-06 NOTE — PROGRESS NOTES
[facility]            [address] [place] [address]              P:[phone]  F:[phone]      Physical Therapy: TREATMENT/PROGRESS NOTE   Patient: Facundo Hartley (63 y.o. male)   Treatment Date: 2024   :  1960 MRN: [id number]   Visit #: 2   Insurance Allowable Auth Needed   12 [x]Yes    []No    Insurance: Payor: HUMANA MEDICARE / Plan: HUMANA CHOICE-PPO MEDICARE / Product Type: *No Product type* /   Insurance ID: [id number] - (Medicare Managed)  Secondary Insurance (if applicable):    Treatment Diagnosis:     ICD-10-CM    1. Left shoulder pain, unspecified chronicity  M25.512       2. Primary osteoarthritis, left shoulder  M19.012          Medical Diagnosis:    No admission diagnoses are documented for this encounter.   Referring Physician: Wally Archibald MD  PCP: Juan Peña MD                             Plan of care signed: YES    Date of Patient follow up with Physician: 2024     Progress Report/POC: NO  POC update due: (10 visits /OR AUTH LIMITS, whichever is less)  2024     L shoulder pain, hx of SAD and biceps tenodesis    Preferred Language for Healthcare:   [x]English       []other:    SUBJECTIVE EXAMINATION     Patient Report/Comments: Patient reports that L shoulder pain has increased since evaluation including increase in pain during HEP which they report occurs with all exercises. Patient reports that pain does not abolish with conclusion of exercises, is present at all times. Patient was working in the kitchen all weekend to build a kitchen island which they admit could have irritated L shoulder.     Test used Initial score  2024   Pain Summary VAS 7-8/10    Functional questionnaire Quick DASH 59.09% limitation    Other:                OBJECTIVE EXAMINATION     Observation: patient has limited L hand mobility secondary to other injuries impacting  for exercise; see eval    Test

## 2024-02-13 ENCOUNTER — TREATMENT (OUTPATIENT)
Dept: PHYSICAL THERAPY | Age: 64
End: 2024-02-13
Payer: MEDICARE

## 2024-02-13 DIAGNOSIS — M25.512 LEFT SHOULDER PAIN, UNSPECIFIED CHRONICITY: Primary | ICD-10-CM

## 2024-02-13 DIAGNOSIS — M19.012 PRIMARY OSTEOARTHRITIS, LEFT SHOULDER: ICD-10-CM

## 2024-02-13 PROCEDURE — 97140 MANUAL THERAPY 1/> REGIONS: CPT | Performed by: PHYSICAL THERAPIST

## 2024-02-13 PROCEDURE — 97530 THERAPEUTIC ACTIVITIES: CPT | Performed by: PHYSICAL THERAPIST

## 2024-02-13 PROCEDURE — 97110 THERAPEUTIC EXERCISES: CPT | Performed by: PHYSICAL THERAPIST

## 2024-02-13 NOTE — PROGRESS NOTES
activities to improve functional performance. (Ex include squatting, ascending/descending stairs, walking, bending, lifting, catching, throwing, pushing, pulling, jumping.)  Direct, one on one contact, billed in 15-minute increments.  (62690) MANUAL THERAPY -  Manual therapy techniques, 1 or more regions, each 15 minutes (Mobilization/manipulation, manual lymphatic drainage, manual traction) for the purpose of modulating pain, promoting relaxation,  increasing ROM, reducing/eliminating soft tissue swelling/inflammation/restriction, improving soft tissue extensibility and allowing for proper ROM for normal function with self care, mobility, lifting and ambulation    TREATMENT PLAN   Plan: Cont POC- Continue emphasis/focus on exercise progression, improving proper muscle recruitment and activation/motor control patterns, and modulating pain. Next visit plan to continue current phase     Electronically Signed by Umair Reyes, [facility]              Date: 02/13/2024     Umair Reyes, Student Physical Therapist      Therapist was present, directed the patient's care, made skilled judgement, and was responsible for assessment and treatment of the patient.            Board-Certified Orthopaedic Clinical Specialist    [place] PT license: [id number]  [place] PT license: [id number]      Note: If patient does not return for scheduled/recommended follow up visits, this note will serve as a discharge from care along with the most recent update on progress.

## 2024-02-20 ENCOUNTER — TREATMENT (OUTPATIENT)
Dept: PHYSICAL THERAPY | Age: 64
End: 2024-02-20
Payer: MEDICARE

## 2024-02-20 DIAGNOSIS — M19.012 PRIMARY OSTEOARTHRITIS, LEFT SHOULDER: ICD-10-CM

## 2024-02-20 DIAGNOSIS — M25.512 LEFT SHOULDER PAIN, UNSPECIFIED CHRONICITY: Primary | ICD-10-CM

## 2024-02-20 PROCEDURE — 97110 THERAPEUTIC EXERCISES: CPT | Performed by: PHYSICAL THERAPIST

## 2024-02-20 PROCEDURE — 97530 THERAPEUTIC ACTIVITIES: CPT | Performed by: PHYSICAL THERAPIST

## 2024-02-20 PROCEDURE — 97140 MANUAL THERAPY 1/> REGIONS: CPT | Performed by: PHYSICAL THERAPIST

## 2024-02-20 NOTE — PROGRESS NOTES
[] Progressing: [] Met: [] Not Met: [] Adjusted    Therapist goals for Patient:   Short Term Goals: To be achieved in: 2 weeks  Independent in HEP and progression per patient tolerance, in order to progress toward full function and prevent re-injury.    Status: [] Progressing: [] Met: [] Not Met: [] Adjusted  Patient will have a decrease in pain to 2/10 to help  facilitate improvement in movement, function, and ADLs as indicated by functional deficits.   Status: [] Progressing: [] Met: [] Not Met: [] Adjusted    Long Term Goals: To be achieved in: 6 weeks  Disability index score of 20% or less for the Quick DASH to assist with return to prior level of function.  Status: [] Progressing: [] Met: [] Not Met: [] Adjusted  Improve L shoulder abduction AROM to 155 degrees or  better to allow for proper joint functioning as indicated by patients functional deficits.  Status: [] Progressing: [] Met: [] Not Met: [] Adjusted  Pt to improve strength to 4+/5 or better in L shoulder abduction, internal rotation to allow for proper muscle and joint use in functional mobility, ADLs and prior level of function   Status: [] Progressing: [] Met: [] Not Met: [] Adjusted  Patient will return to Reaching activities, Sleep, and Bathing/Grooming without increased symptoms or restriction in L shoulder to work towards return to prior level of function.      Status: [] Progressing: [] Met: [] Not Met: [] Adjusted  Patient will be able to lift 10# overhead without increase in L arm symptoms in order to work towards full housework and recreation activities and return to prior level of function.        Status: [] Progressing: [] Met: [] Not Met: [] Adjusted    Overall Progression Towards Functional goals/ Treatment Progress Update:  [] Patient is progressing as expected towards functional goals listed.    [] Progression is slowed due to complexities/Impairments listed.  [] Progression has been slowed due to co-morbidities.  [x] Plan just

## 2024-02-29 ENCOUNTER — OFFICE VISIT (OUTPATIENT)
Dept: ORTHOPEDIC SURGERY | Age: 64
End: 2024-02-29
Payer: MEDICARE

## 2024-02-29 ENCOUNTER — TREATMENT (OUTPATIENT)
Dept: PHYSICAL THERAPY | Age: 64
End: 2024-02-29

## 2024-02-29 VITALS — HEIGHT: 74 IN | WEIGHT: 212 LBS | BODY MASS INDEX: 27.21 KG/M2

## 2024-02-29 DIAGNOSIS — M19.012 ARTHRITIS OF LEFT ACROMIOCLAVICULAR JOINT: ICD-10-CM

## 2024-02-29 DIAGNOSIS — M25.512 LEFT SHOULDER PAIN, UNSPECIFIED CHRONICITY: Primary | ICD-10-CM

## 2024-02-29 DIAGNOSIS — M19.012 PRIMARY OSTEOARTHRITIS, LEFT SHOULDER: ICD-10-CM

## 2024-02-29 PROCEDURE — G8419 CALC BMI OUT NRM PARAM NOF/U: HCPCS | Performed by: ORTHOPAEDIC SURGERY

## 2024-02-29 PROCEDURE — 3017F COLORECTAL CA SCREEN DOC REV: CPT | Performed by: ORTHOPAEDIC SURGERY

## 2024-02-29 PROCEDURE — G8484 FLU IMMUNIZE NO ADMIN: HCPCS | Performed by: ORTHOPAEDIC SURGERY

## 2024-02-29 PROCEDURE — 1036F TOBACCO NON-USER: CPT | Performed by: ORTHOPAEDIC SURGERY

## 2024-02-29 PROCEDURE — 1123F ACP DISCUSS/DSCN MKR DOCD: CPT | Performed by: ORTHOPAEDIC SURGERY

## 2024-02-29 PROCEDURE — 99213 OFFICE O/P EST LOW 20 MIN: CPT | Performed by: ORTHOPAEDIC SURGERY

## 2024-02-29 PROCEDURE — G8427 DOCREV CUR MEDS BY ELIG CLIN: HCPCS | Performed by: ORTHOPAEDIC SURGERY

## 2024-02-29 RX ORDER — DIAZEPAM 2 MG/1
2 TABLET ORAL EVERY 8 HOURS PRN
Qty: 2 TABLET | Refills: 0 | Status: SHIPPED | OUTPATIENT
Start: 2024-02-29 | End: 2024-03-01

## 2024-02-29 NOTE — PROGRESS NOTES
current phase     Electronically Signed by Umair Reyes, [facility]              Date: 02/29/2024     Umair Reyes, Student Physical Therapist      Therapist was present, directed the patient's care, made skilled judgement, and was responsible for assessment and treatment of the patient.            Board-Certified Orthopaedic Clinical Specialist    [place] PT license: [id number]  [place] PT license: [id number]      Note: If patient does not return for scheduled/recommended follow up visits, this note will serve as a discharge from care along with the most recent update on progress.

## 2024-02-29 NOTE — PROGRESS NOTES
[facility]  History and Physical  Shoulder Pain    Date:  2024    Name:  Facundo Hartley  Address:  [address] [place] [address]  [place] [address]    :  1960      Age:   63 y.o.    SSN:  [id number]      Medical Record Number:  [id number]    Reason for Visit:    Shoulder Pain (F/U LEFT SHOULDER)      HPI:   Facundo Hartley is a 63 y.o. male who presents to our office today for follow up of the left shoulder pain. He is a retired  who had a left arthroscopic subacromial decompression with open biceps tenodesis on 3/21/2016.  This patient has done really well following his surgery and was able to return back to his former job.  More recently, 3 months ago he began having increasing pain in his left shoulder.  He does have pain that goes down his arm.  He denies any specific injuries to the shoulder.     He is quite active and continues to work on things around the house as well as going hunting. He underwent an AC joint injection and glenohumeral injection at our last visit for AC joint arthritis and mild glenohumeral arthritis.  He states that these injections and a course of PT have not helped with his pain.      Pain Assessment  Location of Pain: Shoulder  Location Modifiers: Left  Severity of Pain: 6  Quality of Pain: Sharp  Duration of Pain: Persistent  Frequency of Pain: Constant  Aggravating Factors: Other (Comment), Straightening, Stretching  Limiting Behavior: Yes  Relieving Factors: Rest, Other (Comment), Ice, Exercise  Work-Related Injury: No  Are there other pain locations you wish to document?: No    No notes on file    Review of Systems:  A 14 point review of systems available in the scanned medical record as documented by the patient and reviewed on 2024.  The review is negative with the exception of those things mentioned in the History of Present Illness and Past Medical History.      Past Medical History:  Patient's

## 2024-03-01 ENCOUNTER — PREP FOR PROCEDURE (OUTPATIENT)
Dept: ORTHOPEDIC SURGERY | Age: 64
End: 2024-03-01

## 2024-03-01 DIAGNOSIS — M25.512 LEFT SHOULDER PAIN, UNSPECIFIED CHRONICITY: Primary | ICD-10-CM

## 2024-03-06 ENCOUNTER — TELEPHONE (OUTPATIENT)
Dept: ORTHOPEDIC SURGERY | Age: 64
End: 2024-03-06

## 2024-03-14 ENCOUNTER — OFFICE VISIT (OUTPATIENT)
Dept: ORTHOPEDIC SURGERY | Age: 64
End: 2024-03-14
Payer: MEDICARE

## 2024-03-14 VITALS — BODY MASS INDEX: 27.21 KG/M2 | WEIGHT: 212 LBS | HEIGHT: 74 IN

## 2024-03-14 DIAGNOSIS — M19.019 AC JOINT ARTHROPATHY: Primary | ICD-10-CM

## 2024-03-14 DIAGNOSIS — M19.012 ARTHRITIS OF LEFT ACROMIOCLAVICULAR JOINT: ICD-10-CM

## 2024-03-14 DIAGNOSIS — M25.512 LEFT SHOULDER PAIN, UNSPECIFIED CHRONICITY: ICD-10-CM

## 2024-03-14 PROCEDURE — G8419 CALC BMI OUT NRM PARAM NOF/U: HCPCS | Performed by: PHYSICIAN ASSISTANT

## 2024-03-14 PROCEDURE — 1123F ACP DISCUSS/DSCN MKR DOCD: CPT | Performed by: PHYSICIAN ASSISTANT

## 2024-03-14 PROCEDURE — 1036F TOBACCO NON-USER: CPT | Performed by: PHYSICIAN ASSISTANT

## 2024-03-14 PROCEDURE — 99214 OFFICE O/P EST MOD 30 MIN: CPT | Performed by: PHYSICIAN ASSISTANT

## 2024-03-14 PROCEDURE — G8484 FLU IMMUNIZE NO ADMIN: HCPCS | Performed by: PHYSICIAN ASSISTANT

## 2024-03-14 PROCEDURE — 3017F COLORECTAL CA SCREEN DOC REV: CPT | Performed by: PHYSICIAN ASSISTANT

## 2024-03-14 PROCEDURE — G8427 DOCREV CUR MEDS BY ELIG CLIN: HCPCS | Performed by: PHYSICIAN ASSISTANT

## 2024-03-14 NOTE — PROGRESS NOTES
questions.     3/14/2024  4:27 PM      Sushma Nagar, PA-C  Orthopaedic Sports Medicine Physician Assistant      This dictation was performed with a verbal recognition program (DRAGON) and it was checked for errors.  It is possible that there are still dictated errors within this office note.  If so, please bring any errors to my attention for an addendum.  All efforts were made to ensure that this office note is accurate.

## 2024-04-11 ENCOUNTER — OFFICE VISIT (OUTPATIENT)
Dept: ORTHOPEDIC SURGERY | Age: 64
End: 2024-04-11

## 2024-04-11 VITALS — BODY MASS INDEX: 27.21 KG/M2 | HEIGHT: 74 IN | WEIGHT: 212 LBS

## 2024-04-11 DIAGNOSIS — M25.512 LEFT SHOULDER PAIN, UNSPECIFIED CHRONICITY: ICD-10-CM

## 2024-04-11 DIAGNOSIS — M19.012 ARTHRITIS OF LEFT ACROMIOCLAVICULAR JOINT: ICD-10-CM

## 2024-04-11 DIAGNOSIS — M19.012 PRIMARY OSTEOARTHRITIS OF LEFT SHOULDER: ICD-10-CM

## 2024-04-11 DIAGNOSIS — M19.019 AC JOINT ARTHROPATHY: Primary | ICD-10-CM

## 2024-04-11 NOTE — PROGRESS NOTES
distal clavicle    Assessment:  Facundo Hartley is a 63 y.o. male with left shoulder pain related to AC joint arthropathy with mild osteoarthritis.     Impression:  Encounter Diagnoses   Name Primary?    AC joint arthropathy Yes    Left shoulder pain, unspecified chronicity     Primary osteoarthritis of left shoulder     Arthritis of left acromioclavicular joint        Office Procedures:  No orders of the defined types were placed in this encounter.      Plan:   We discussed with Facundo Hartley that his MRI does show osteophytes at the AC joint which may be contributing to his current symptoms.  We recommend that the patient use oral anti-inflammatory agents and or topical creams like Voltaren cream and ice for comfort.  We do not recommend repeating a corticosteroid injection since they have not been helpful over the last 2 times.  His next best option really is to have an arthroscopic distal clavicle excision.  We can certainly do this at any point he decides he is ready for surgery.  All of his questions were fully answered today.    Facundo Hartley will follow up in 8 weeks and/or as needed. He was in agreement with this plan and all questions were answered to his satisfaction. He was encouraged to call with any questions.     Greater than 20 minutes were expended on all aspects of today's visit including documentation, but excluding any separately billable procedures.    4/11/2024  12:53 PM      Sushma Nagar, PA-C  Orthopaedic Sports Medicine Physician Assistant    During this examination, I, Sushma Nagar, PA-C, functioned as a scribe for Dr. Wally Archibald.     This dictation was performed with a verbal recognition program (DRAGON) and it was checked for errors.  It is possible that there are still dictated errors within this office note.  If so, please bring any errors to my attention for an addendum.  All efforts were made to ensure that this office note is accurate.  _________________  Dr. Wally HENRY

## 2024-08-02 ENCOUNTER — TELEPHONE (OUTPATIENT)
Dept: ORTHOPEDIC SURGERY | Age: 64
End: 2024-08-02

## 2024-08-02 NOTE — TELEPHONE ENCOUNTER
Surgery and/or Procedure Scheduling     Contact Name: URIEL SOLORZANO  Surgical/Procedure Request: LT SHOULDER SX  Patient Contact Number: [phone]    PATIENT CALLED TO SCHEDULE LT SHOULDER SURGERY.     PLEASE ADVISE

## 2024-08-08 ENCOUNTER — OFFICE VISIT (OUTPATIENT)
Dept: ORTHOPEDIC SURGERY | Age: 64
End: 2024-08-08
Payer: MEDICARE

## 2024-08-08 VITALS — HEIGHT: 74 IN | BODY MASS INDEX: 27.21 KG/M2 | WEIGHT: 212 LBS

## 2024-08-08 DIAGNOSIS — M25.512 LEFT SHOULDER PAIN, UNSPECIFIED CHRONICITY: ICD-10-CM

## 2024-08-08 DIAGNOSIS — M19.019 AC JOINT ARTHROPATHY: ICD-10-CM

## 2024-08-08 DIAGNOSIS — M19.012 ARTHRITIS OF LEFT ACROMIOCLAVICULAR JOINT: Primary | ICD-10-CM

## 2024-08-08 PROCEDURE — 99214 OFFICE O/P EST MOD 30 MIN: CPT | Performed by: ORTHOPAEDIC SURGERY

## 2024-08-08 PROCEDURE — 1123F ACP DISCUSS/DSCN MKR DOCD: CPT | Performed by: ORTHOPAEDIC SURGERY

## 2024-08-08 PROCEDURE — G8427 DOCREV CUR MEDS BY ELIG CLIN: HCPCS | Performed by: ORTHOPAEDIC SURGERY

## 2024-08-08 PROCEDURE — L3670 SO ACRO/CLAV CAN WEB PRE OTS: HCPCS | Performed by: ORTHOPAEDIC SURGERY

## 2024-08-08 PROCEDURE — 1036F TOBACCO NON-USER: CPT | Performed by: ORTHOPAEDIC SURGERY

## 2024-08-08 PROCEDURE — 3017F COLORECTAL CA SCREEN DOC REV: CPT | Performed by: ORTHOPAEDIC SURGERY

## 2024-08-08 PROCEDURE — G8419 CALC BMI OUT NRM PARAM NOF/U: HCPCS | Performed by: ORTHOPAEDIC SURGERY

## 2024-08-08 RX ORDER — ROPINIROLE 0.5 MG/1
0.5 TABLET, FILM COATED ORAL NIGHTLY
COMMUNITY
Start: 2024-05-28 | End: 2024-08-14

## 2024-08-08 RX ORDER — DAPAGLIFLOZIN 10 MG/1
10 TABLET, FILM COATED ORAL DAILY
COMMUNITY
Start: 2024-07-10 | End: 2024-08-14

## 2024-08-08 NOTE — H&P (VIEW-ONLY)
marginal osteophytes at the distal clavicle    Assessment:  Facundo Hartley is a 64 y.o. male with left shoulder pain related to AC joint arthropathy with mild glenohumeral osteoarthritis. He is a candidate for arthroscopic intervention.    Impression:  Encounter Diagnoses   Name Primary?    Arthritis of left acromioclavicular joint Yes    AC joint arthropathy     Left shoulder pain, unspecified chronicity          Office Procedures:  Orders Placed This Encounter   Procedures    DJO ultrasling Shoulder Sling     Patient was prescribed a DJO Ultrasling IV Shoulder Brace.   The left shoulder will require stabilization / immobilization from this orthosis.  The orthosis will assist in protecting the affected area, provide functional support and facilitate healing.  The device was ordered and fit on 8/08/2024.    The patient was educated and fit by a healthcare professional with expert knowledge and specialization in brace application while under the direct supervision of the treating physician.  Verbal and written instructions for the use of and application of this item were provided.   They were instructed to contact the office immediately should the brace result in increased pain, decreased sensation, increased swelling or worsening of the condition.       Plan:   We discussed with Facundo Hartley that his MRI does show osteophytes at the AC joint which may be contributing to his current symptoms.  Definitive treatment includes surgical intervention.  He may continue to  use oral anti-inflammatory agents and or topical creams like Voltaren cream and ice for comfort until his surgery.  We do not recommend repeating a corticosteroid injection since they have not been helpful over the last 2 times.  His next best option really is to have an arthroscopic distal clavicle excision.  We went on to fit him with a sling today.    Risks, benefits and potential complications of arthroscopic shoulder surgery were discussed

## 2024-08-08 NOTE — PROGRESS NOTES
Rash       Physical Exam:  There were no vitals filed for this visit.  General: Facundo Hartley is a healthy and well appearing 64 y.o. male who is sitting comfortably in our office in acute distress.     Skin:  There are no skin lesions, cellulitis, or extreme edema. The patient has warm and well-perfused Bilateral upper extremities with brisk capillary refill.    Eyes: Extra-ocular muscles intact  Mouth: Oral mucosa moist. No perioral lesions  Pulm: Respirations unlabored and regular.  Neuro: Alert and oriented x3    Left Shoulder Exam:  Inspection:  No gross deformities, no signs of infection.     Palpation:  There is no crepitus.  Tenderness to palpation over the AC joint, rotator cuff footprint. Non-tender to palpation over the bicipital groove and posterior joint line.     Active Range of Motion: Forward elevation of 170, abduction of 170, external rotation with elbow at the side 50, internal rotation to the back is T8     Passive Range of Motion: Similar to active     Strength:   External rotation with resistance with elbow at the side 5/5, internal rotation with resistance with elbow at the side 5/5, Champagne toast testing 5/5, Jobes test 5/5     Special Tests:   No Kunal muscle deformity.     Neurovascular: Sensation to light touch is intact, no motor deficits, palpable radial pulses 2+    Additional Examinations:    Examination of the contralateral extremity does not show any tenderness, deformity or injury. Range of motion is unremarkable. There is no gross instability. There are no rashes, ulcerations or lesions. Strength and tone are normal.      Radiographic:  MRI left shoulder 3/7/2024  Superior labral tear with anterior to posterior extension, 1/6:00  Moderate rotator cuff tendinitis.  Bursal surface fraying of the supraspinatus and infraspinatus and delamination of the subscapularis tendon.  Glenohumeral joint articular cartilage is intact.  Status post long head biceps tenodesis.  Small

## 2024-08-12 ENCOUNTER — PREP FOR PROCEDURE (OUTPATIENT)
Dept: ORTHOPEDIC SURGERY | Age: 64
End: 2024-08-12

## 2024-08-12 ENCOUNTER — TELEPHONE (OUTPATIENT)
Dept: ORTHOPEDIC SURGERY | Age: 64
End: 2024-08-12

## 2024-08-12 DIAGNOSIS — M19.012 ARTHRITIS OF LEFT ACROMIOCLAVICULAR JOINT: Primary | ICD-10-CM

## 2024-08-12 DIAGNOSIS — M25.512 LEFT SHOULDER PAIN, UNSPECIFIED CHRONICITY: ICD-10-CM

## 2024-08-12 DIAGNOSIS — M19.019 AC JOINT ARTHROPATHY: ICD-10-CM

## 2024-08-12 NOTE — TELEPHONE ENCOUNTER
General Question     Subject: PRE OP QUESTION  Patient and /or Facility Request: Facundo Hartley   Contact Number: [phone]     PT CLLED TO ASK WHAT TIME SX ON 8/21     PLEASE CLL TO ADV  
Spoke with patient     
WDL

## 2024-08-14 ENCOUNTER — TELEPHONE (OUTPATIENT)
Dept: ORTHOPEDIC SURGERY | Age: 64
End: 2024-08-14

## 2024-08-14 NOTE — TELEPHONE ENCOUNTER
[phone]     Medical Facility Question     Facility Name: [facility]   Contact Name: ANEL  Contact Number: [phone] (DIRECT LINE)   Request or Information: 2 QUESTIONS    ANEL MANAGES THE Pt's HEMOPHILIA.     1)     WHAT TIME IS THE Pt's SURGERY?    2)    WILL A NOTE FAXED OVER SERVE AS THE ORDER FOR THE FACTOR REPLACEMENT THAT THE Pt WILL NEED DURING SX?     PLEASE CALL TO ADVISE AND PROVIDE FAX # IF THE NOTE WILL SUFFICE?

## 2024-08-15 ENCOUNTER — TELEPHONE (OUTPATIENT)
Dept: ORTHOPEDIC SURGERY | Age: 64
End: 2024-08-15

## 2024-08-15 PROBLEM — D66 HEMOPHILIA A (HCC): Chronic | Status: ACTIVE | Noted: 2023-03-02

## 2024-08-15 NOTE — TELEPHONE ENCOUNTER
General Question     Subject: SURGERY TIME/DATE  Patient and /or Facility Request: URIEL SOLORZANO  Contact Number: [phone]    PATIENT REQUESTS CALLBACK TODAY    PT IS INQUIRING WHEN HE NEEDS TO BE AT HOSPITAL FOR SURGERY.     PLEASE ADVISE

## 2024-08-19 RX ORDER — ANTIHEMOPHILIC FACTOR (RECOMBINANT) 500 (+/-)
3500 KIT INTRAVENOUS ONCE
Qty: 7 EACH | Refills: 0 | Status: CANCELLED | OUTPATIENT
Start: 2024-08-19 | End: 2024-08-19

## 2024-08-20 ENCOUNTER — HOSPITAL ENCOUNTER (OUTPATIENT)
Age: 64
Setting detail: OUTPATIENT SURGERY
Discharge: HOME | End: 2024-08-20
Attending: ORTHOPAEDIC SURGERY | Admitting: ORTHOPAEDIC SURGERY
Payer: MEDICARE

## 2024-08-20 ENCOUNTER — ANESTHESIA (OUTPATIENT)
Dept: OPERATING ROOM | Age: 64
End: 2024-08-20
Payer: MEDICARE

## 2024-08-20 ENCOUNTER — ANESTHESIA EVENT (OUTPATIENT)
Dept: OPERATING ROOM | Age: 64
End: 2024-08-20
Payer: MEDICARE

## 2024-08-20 VITALS
TEMPERATURE: 97 F | OXYGEN SATURATION: 95 % | HEIGHT: 74 IN | SYSTOLIC BLOOD PRESSURE: 120 MMHG | BODY MASS INDEX: 27.49 KG/M2 | WEIGHT: 214.2 LBS | HEART RATE: 63 BPM | RESPIRATION RATE: 18 BRPM | DIASTOLIC BLOOD PRESSURE: 79 MMHG

## 2024-08-20 DIAGNOSIS — M25.512 LEFT SHOULDER PAIN, UNSPECIFIED CHRONICITY: Primary | ICD-10-CM

## 2024-08-20 LAB
ABO + RH BLD: NORMAL
ANION GAP SERPL CALCULATED.3IONS-SCNC: 11 MMOL/L (ref 3–16)
APTT BLD: 39.6 SEC (ref 22.1–36.4)
BASOPHILS # BLD: 0 K/UL (ref 0–0.2)
BASOPHILS NFR BLD: 0.5 %
BLD GP AB SCN SERPL QL: NORMAL
BUN SERPL-MCNC: 16 MG/DL (ref 7–20)
CALCIUM SERPL-MCNC: 9.4 MG/DL (ref 8.3–10.6)
CHLORIDE SERPL-SCNC: 99 MMOL/L (ref 99–110)
CO2 SERPL-SCNC: 26 MMOL/L (ref 21–32)
CREAT SERPL-MCNC: 1 MG/DL (ref 0.8–1.3)
DEPRECATED RDW RBC AUTO: 13.9 % (ref 12.4–15.4)
EOSINOPHIL # BLD: 0.2 K/UL (ref 0–0.6)
EOSINOPHIL NFR BLD: 2.8 %
GFR SERPLBLD CREATININE-BSD FMLA CKD-EPI: 83 ML/MIN/{1.73_M2}
GLUCOSE BLD-MCNC: 217 MG/DL (ref 70–99)
GLUCOSE SERPL-MCNC: 197 MG/DL (ref 70–99)
HCT VFR BLD AUTO: 43.8 % (ref 40.5–52.5)
HGB BLD-MCNC: 15.1 G/DL (ref 13.5–17.5)
INR PPP: 1.03 (ref 0.85–1.15)
LYMPHOCYTES # BLD: 1.3 K/UL (ref 1–5.1)
LYMPHOCYTES NFR BLD: 18.3 %
MCH RBC QN AUTO: 32.8 PG (ref 26–34)
MCHC RBC AUTO-ENTMCNC: 34.5 G/DL (ref 31–36)
MCV RBC AUTO: 95.2 FL (ref 80–100)
MONOCYTES # BLD: 0.7 K/UL (ref 0–1.3)
MONOCYTES NFR BLD: 9.3 %
NEUTROPHILS # BLD: 5 K/UL (ref 1.7–7.7)
NEUTROPHILS NFR BLD: 69.1 %
PERFORMED ON: ABNORMAL
PLATELET # BLD AUTO: 188 K/UL (ref 135–450)
PMV BLD AUTO: 8.7 FL (ref 5–10.5)
POTASSIUM SERPL-SCNC: 4.3 MMOL/L (ref 3.5–5.1)
PROTHROMBIN TIME: 13.7 SEC (ref 11.9–14.9)
RBC # BLD AUTO: 4.6 M/UL (ref 4.2–5.9)
SODIUM SERPL-SCNC: 136 MMOL/L (ref 136–145)
WBC # BLD AUTO: 7.3 K/UL (ref 4–11)

## 2024-08-20 PROCEDURE — 7100000000 HC PACU RECOVERY - FIRST 15 MIN: Performed by: ORTHOPAEDIC SURGERY

## 2024-08-20 PROCEDURE — 3700000001 HC ADD 15 MINUTES (ANESTHESIA): Performed by: ORTHOPAEDIC SURGERY

## 2024-08-20 PROCEDURE — 3600000014 HC SURGERY LEVEL 4 ADDTL 15MIN: Performed by: ORTHOPAEDIC SURGERY

## 2024-08-20 PROCEDURE — A4217 STERILE WATER/SALINE, 500 ML: HCPCS | Performed by: ORTHOPAEDIC SURGERY

## 2024-08-20 PROCEDURE — 6360000002 HC RX W HCPCS

## 2024-08-20 PROCEDURE — 85730 THROMBOPLASTIN TIME PARTIAL: CPT

## 2024-08-20 PROCEDURE — 6360000002 HC RX W HCPCS: Performed by: PHYSICIAN ASSISTANT

## 2024-08-20 PROCEDURE — 85240 CLOT FACTOR VIII AHG 1 STAGE: CPT

## 2024-08-20 PROCEDURE — 2720000010 HC SURG SUPPLY STERILE: Performed by: ORTHOPAEDIC SURGERY

## 2024-08-20 PROCEDURE — 2580000003 HC RX 258: Performed by: ANESTHESIOLOGY

## 2024-08-20 PROCEDURE — 7100000010 HC PHASE II RECOVERY - FIRST 15 MIN: Performed by: ORTHOPAEDIC SURGERY

## 2024-08-20 PROCEDURE — 86901 BLOOD TYPING SEROLOGIC RH(D): CPT

## 2024-08-20 PROCEDURE — 2709999900 HC NON-CHARGEABLE SUPPLY: Performed by: ORTHOPAEDIC SURGERY

## 2024-08-20 PROCEDURE — 2500000003 HC RX 250 WO HCPCS

## 2024-08-20 PROCEDURE — 86850 RBC ANTIBODY SCREEN: CPT

## 2024-08-20 PROCEDURE — 86900 BLOOD TYPING SEROLOGIC ABO: CPT

## 2024-08-20 PROCEDURE — 85025 COMPLETE CBC W/AUTO DIFF WBC: CPT

## 2024-08-20 PROCEDURE — 2580000003 HC RX 258

## 2024-08-20 PROCEDURE — 6360000002 HC RX W HCPCS: Performed by: ANESTHESIOLOGY

## 2024-08-20 PROCEDURE — 3700000000 HC ANESTHESIA ATTENDED CARE: Performed by: ORTHOPAEDIC SURGERY

## 2024-08-20 PROCEDURE — 64415 NJX AA&/STRD BRCH PLXS IMG: CPT | Performed by: ANESTHESIOLOGY

## 2024-08-20 PROCEDURE — 2580000003 HC RX 258: Performed by: ORTHOPAEDIC SURGERY

## 2024-08-20 PROCEDURE — 2500000003 HC RX 250 WO HCPCS: Performed by: ORTHOPAEDIC SURGERY

## 2024-08-20 PROCEDURE — C9290 INJ, BUPIVACAINE LIPOSOME: HCPCS | Performed by: ANESTHESIOLOGY

## 2024-08-20 PROCEDURE — 7100000011 HC PHASE II RECOVERY - ADDTL 15 MIN: Performed by: ORTHOPAEDIC SURGERY

## 2024-08-20 PROCEDURE — 7100000001 HC PACU RECOVERY - ADDTL 15 MIN: Performed by: ORTHOPAEDIC SURGERY

## 2024-08-20 PROCEDURE — 6360000002 HC RX W HCPCS: Performed by: ORTHOPAEDIC SURGERY

## 2024-08-20 PROCEDURE — 80048 BASIC METABOLIC PNL TOTAL CA: CPT

## 2024-08-20 PROCEDURE — 85610 PROTHROMBIN TIME: CPT

## 2024-08-20 PROCEDURE — 3600000004 HC SURGERY LEVEL 4 BASE: Performed by: ORTHOPAEDIC SURGERY

## 2024-08-20 RX ORDER — FENTANYL CITRATE 50 UG/ML
INJECTION, SOLUTION INTRAMUSCULAR; INTRAVENOUS PRN
Status: DISCONTINUED | OUTPATIENT
Start: 2024-08-20 | End: 2024-08-20 | Stop reason: SDUPTHER

## 2024-08-20 RX ORDER — ONDANSETRON 2 MG/ML
4 INJECTION INTRAMUSCULAR; INTRAVENOUS
Status: DISCONTINUED | OUTPATIENT
Start: 2024-08-20 | End: 2024-08-20 | Stop reason: HOSPADM

## 2024-08-20 RX ORDER — BUPIVACAINE HYDROCHLORIDE 5 MG/ML
INJECTION, SOLUTION EPIDURAL; INTRACAUDAL
Status: COMPLETED
Start: 2024-08-20 | End: 2024-08-20

## 2024-08-20 RX ORDER — SODIUM CHLORIDE 0.9 % (FLUSH) 0.9 %
5-40 SYRINGE (ML) INJECTION PRN
Status: DISCONTINUED | OUTPATIENT
Start: 2024-08-20 | End: 2024-08-20 | Stop reason: HOSPADM

## 2024-08-20 RX ORDER — HYDROMORPHONE HYDROCHLORIDE 1 MG/ML
0.5 INJECTION, SOLUTION INTRAMUSCULAR; INTRAVENOUS; SUBCUTANEOUS EVERY 5 MIN PRN
Status: DISCONTINUED | OUTPATIENT
Start: 2024-08-20 | End: 2024-08-20 | Stop reason: HOSPADM

## 2024-08-20 RX ORDER — ROCURONIUM BROMIDE 10 MG/ML
INJECTION, SOLUTION INTRAVENOUS PRN
Status: DISCONTINUED | OUTPATIENT
Start: 2024-08-20 | End: 2024-08-20 | Stop reason: SDUPTHER

## 2024-08-20 RX ORDER — SODIUM CHLORIDE 9 MG/ML
INJECTION, SOLUTION INTRAVENOUS PRN
Status: DISCONTINUED | OUTPATIENT
Start: 2024-08-20 | End: 2024-08-20 | Stop reason: HOSPADM

## 2024-08-20 RX ORDER — SENNOSIDES 8.6 MG
1 TABLET ORAL DAILY
Qty: 30 TABLET | Refills: 0 | Status: SHIPPED | OUTPATIENT
Start: 2024-08-20

## 2024-08-20 RX ORDER — DEXAMETHASONE SODIUM PHOSPHATE 4 MG/ML
INJECTION, SOLUTION INTRA-ARTICULAR; INTRALESIONAL; INTRAMUSCULAR; INTRAVENOUS; SOFT TISSUE PRN
Status: DISCONTINUED | OUTPATIENT
Start: 2024-08-20 | End: 2024-08-20 | Stop reason: SDUPTHER

## 2024-08-20 RX ORDER — GLYCOPYRROLATE 0.2 MG/ML
INJECTION INTRAMUSCULAR; INTRAVENOUS PRN
Status: DISCONTINUED | OUTPATIENT
Start: 2024-08-20 | End: 2024-08-20 | Stop reason: SDUPTHER

## 2024-08-20 RX ORDER — SODIUM CHLORIDE 0.9 % (FLUSH) 0.9 %
5-40 SYRINGE (ML) INJECTION EVERY 12 HOURS SCHEDULED
Status: DISCONTINUED | OUTPATIENT
Start: 2024-08-20 | End: 2024-08-20 | Stop reason: HOSPADM

## 2024-08-20 RX ORDER — CLINDAMYCIN PHOSPHATE 600 MG/50ML
INJECTION, SOLUTION INTRAVENOUS
Status: DISCONTINUED
Start: 2024-08-20 | End: 2024-08-20 | Stop reason: WASHOUT

## 2024-08-20 RX ORDER — BUPIVACAINE HYDROCHLORIDE 5 MG/ML
INJECTION, SOLUTION EPIDURAL; INTRACAUDAL PRN
Status: DISCONTINUED | OUTPATIENT
Start: 2024-08-20 | End: 2024-08-20 | Stop reason: SDUPTHER

## 2024-08-20 RX ORDER — MEPERIDINE HYDROCHLORIDE 25 MG/ML
12.5 INJECTION INTRAMUSCULAR; INTRAVENOUS; SUBCUTANEOUS EVERY 5 MIN PRN
Status: DISCONTINUED | OUTPATIENT
Start: 2024-08-20 | End: 2024-08-20 | Stop reason: HOSPADM

## 2024-08-20 RX ORDER — SODIUM CHLORIDE, SODIUM LACTATE, POTASSIUM CHLORIDE, CALCIUM CHLORIDE 600; 310; 30; 20 MG/100ML; MG/100ML; MG/100ML; MG/100ML
INJECTION, SOLUTION INTRAVENOUS CONTINUOUS
Status: DISCONTINUED | OUTPATIENT
Start: 2024-08-20 | End: 2024-08-20 | Stop reason: HOSPADM

## 2024-08-20 RX ORDER — OXYCODONE HYDROCHLORIDE 5 MG/1
5 TABLET ORAL
Status: DISCONTINUED | OUTPATIENT
Start: 2024-08-20 | End: 2024-08-20 | Stop reason: HOSPADM

## 2024-08-20 RX ORDER — ONDANSETRON 4 MG/1
4 TABLET, FILM COATED ORAL 3 TIMES DAILY PRN
Qty: 15 TABLET | Refills: 0 | Status: SHIPPED | OUTPATIENT
Start: 2024-08-20

## 2024-08-20 RX ORDER — PROPOFOL 10 MG/ML
INJECTION, EMULSION INTRAVENOUS PRN
Status: DISCONTINUED | OUTPATIENT
Start: 2024-08-20 | End: 2024-08-20 | Stop reason: SDUPTHER

## 2024-08-20 RX ORDER — LIDOCAINE HYDROCHLORIDE 20 MG/ML
INJECTION, SOLUTION INTRAVENOUS PRN
Status: DISCONTINUED | OUTPATIENT
Start: 2024-08-20 | End: 2024-08-20 | Stop reason: SDUPTHER

## 2024-08-20 RX ORDER — BUPIVACAINE HYDROCHLORIDE AND EPINEPHRINE 5; 5 MG/ML; UG/ML
INJECTION, SOLUTION EPIDURAL; INTRACAUDAL; PERINEURAL PRN
Status: DISCONTINUED | OUTPATIENT
Start: 2024-08-20 | End: 2024-08-20 | Stop reason: HOSPADM

## 2024-08-20 RX ORDER — LABETALOL HYDROCHLORIDE 5 MG/ML
10 INJECTION, SOLUTION INTRAVENOUS
Status: DISCONTINUED | OUTPATIENT
Start: 2024-08-20 | End: 2024-08-20 | Stop reason: HOSPADM

## 2024-08-20 RX ORDER — CLINDAMYCIN PHOSPHATE 900 MG/50ML
900 INJECTION, SOLUTION INTRAVENOUS ONCE
Status: COMPLETED | OUTPATIENT
Start: 2024-08-20 | End: 2024-08-20

## 2024-08-20 RX ORDER — NALOXONE HYDROCHLORIDE 0.4 MG/ML
INJECTION, SOLUTION INTRAMUSCULAR; INTRAVENOUS; SUBCUTANEOUS PRN
Status: DISCONTINUED | OUTPATIENT
Start: 2024-08-20 | End: 2024-08-20 | Stop reason: HOSPADM

## 2024-08-20 RX ORDER — HYDRALAZINE HYDROCHLORIDE 20 MG/ML
10 INJECTION INTRAMUSCULAR; INTRAVENOUS
Status: DISCONTINUED | OUTPATIENT
Start: 2024-08-20 | End: 2024-08-20 | Stop reason: HOSPADM

## 2024-08-20 RX ORDER — PHENYLEPHRINE HYDROCHLORIDE 10 MG/ML
INJECTION INTRAVENOUS PRN
Status: DISCONTINUED | OUTPATIENT
Start: 2024-08-20 | End: 2024-08-20 | Stop reason: SDUPTHER

## 2024-08-20 RX ORDER — ONDANSETRON 2 MG/ML
INJECTION INTRAMUSCULAR; INTRAVENOUS PRN
Status: DISCONTINUED | OUTPATIENT
Start: 2024-08-20 | End: 2024-08-20 | Stop reason: SDUPTHER

## 2024-08-20 RX ORDER — HYDROCODONE BITARTRATE AND ACETAMINOPHEN 5; 325 MG/1; MG/1
1 TABLET ORAL EVERY 6 HOURS PRN
Qty: 20 TABLET | Refills: 0 | Status: SHIPPED | OUTPATIENT
Start: 2024-08-20 | End: 2024-08-27

## 2024-08-20 RX ORDER — PROCHLORPERAZINE EDISYLATE 5 MG/ML
5 INJECTION INTRAMUSCULAR; INTRAVENOUS
Status: DISCONTINUED | OUTPATIENT
Start: 2024-08-20 | End: 2024-08-20 | Stop reason: HOSPADM

## 2024-08-20 RX ORDER — SUCCINYLCHOLINE/SOD CL,ISO/PF 200MG/10ML
SYRINGE (ML) INTRAVENOUS PRN
Status: DISCONTINUED | OUTPATIENT
Start: 2024-08-20 | End: 2024-08-20 | Stop reason: SDUPTHER

## 2024-08-20 RX ADMIN — PHENYLEPHRINE HYDROCHLORIDE 100 MCG: 10 INJECTION, SOLUTION INTRAVENOUS at 12:13

## 2024-08-20 RX ADMIN — ONDANSETRON 4 MG: 2 INJECTION INTRAMUSCULAR; INTRAVENOUS at 12:00

## 2024-08-20 RX ADMIN — PHENYLEPHRINE HYDROCHLORIDE 100 MCG: 10 INJECTION, SOLUTION INTRAVENOUS at 12:23

## 2024-08-20 RX ADMIN — LIDOCAINE HYDROCHLORIDE 100 MG: 20 INJECTION, SOLUTION INTRAVENOUS at 11:47

## 2024-08-20 RX ADMIN — SODIUM CHLORIDE, POTASSIUM CHLORIDE, SODIUM LACTATE AND CALCIUM CHLORIDE: 600; 310; 30; 20 INJECTION, SOLUTION INTRAVENOUS at 10:18

## 2024-08-20 RX ADMIN — FENTANYL CITRATE 100 MCG: 50 INJECTION, SOLUTION INTRAMUSCULAR; INTRAVENOUS at 11:26

## 2024-08-20 RX ADMIN — BUPIVACAINE 10 ML: 13.3 INJECTION, SUSPENSION, LIPOSOMAL INFILTRATION at 11:32

## 2024-08-20 RX ADMIN — BUPIVACAINE HYDROCHLORIDE 30 ML: 5 INJECTION, SOLUTION EPIDURAL; INTRACAUDAL; PERINEURAL at 11:32

## 2024-08-20 RX ADMIN — ROCURONIUM BROMIDE 50 MG: 10 INJECTION, SOLUTION INTRAVENOUS at 11:55

## 2024-08-20 RX ADMIN — CLINDAMYCIN PHOSPHATE 900 MG: 900 INJECTION, SOLUTION INTRAVENOUS at 11:41

## 2024-08-20 RX ADMIN — PROPOFOL 200 MG: 10 INJECTION, EMULSION INTRAVENOUS at 11:47

## 2024-08-20 RX ADMIN — PHENYLEPHRINE HYDROCHLORIDE 20 MCG/MIN: 10 INJECTION INTRAVENOUS at 12:24

## 2024-08-20 RX ADMIN — PHENYLEPHRINE HYDROCHLORIDE 100 MCG: 10 INJECTION, SOLUTION INTRAVENOUS at 12:02

## 2024-08-20 RX ADMIN — Medication 140 MG: at 11:47

## 2024-08-20 RX ADMIN — SUGAMMADEX 200 MG: 100 INJECTION, SOLUTION INTRAVENOUS at 12:56

## 2024-08-20 RX ADMIN — GLYCOPYRROLATE 0.2 MG: 0.2 INJECTION INTRAMUSCULAR; INTRAVENOUS at 12:19

## 2024-08-20 RX ADMIN — PHENYLEPHRINE HYDROCHLORIDE 100 MCG: 10 INJECTION, SOLUTION INTRAVENOUS at 12:05

## 2024-08-20 RX ADMIN — DEXAMETHASONE SODIUM PHOSPHATE 8 MG: 4 INJECTION INTRA-ARTICULAR; INTRALESIONAL; INTRAMUSCULAR; INTRAVENOUS; SOFT TISSUE at 12:00

## 2024-08-20 RX ADMIN — ANTIHEMOPHILIC FACTOR (RECOMBINANT) 3500 INT'L UNITS: KIT at 10:46

## 2024-08-20 RX ADMIN — SODIUM CHLORIDE, POTASSIUM CHLORIDE, SODIUM LACTATE AND CALCIUM CHLORIDE: 600; 310; 30; 20 INJECTION, SOLUTION INTRAVENOUS at 12:38

## 2024-08-20 ASSESSMENT — PAIN - FUNCTIONAL ASSESSMENT
PAIN_FUNCTIONAL_ASSESSMENT: NONE - DENIES PAIN
PAIN_FUNCTIONAL_ASSESSMENT: PREVENTS OR INTERFERES SOME ACTIVE ACTIVITIES AND ADLS
PAIN_FUNCTIONAL_ASSESSMENT: 0-10
PAIN_FUNCTIONAL_ASSESSMENT: 0-10
PAIN_FUNCTIONAL_ASSESSMENT: PREVENTS OR INTERFERES SOME ACTIVE ACTIVITIES AND ADLS

## 2024-08-20 ASSESSMENT — PAIN DESCRIPTION - DESCRIPTORS
DESCRIPTORS: ACHING;BURNING
DESCRIPTORS: ACHING;STABBING

## 2024-08-20 ASSESSMENT — PAIN SCALES - GENERAL: PAINLEVEL_OUTOF10: 0

## 2024-08-20 NOTE — BRIEF OP NOTE
Brief Postoperative Note      Patient: Facundo Hartley  YOB: 1960  MRN: [id number]    Date of Procedure: 8/20/2024    Pre-Op Diagnosis Codes:      * Left shoulder pain [M25.512]    Post-Op Diagnosis: Same       Procedure(s):  LEFT SHOULDER ARTHROSCOPY DISTAL CLAVICLE EXCISION    Surgeon(s):  Wally Archibald MD    Assistant:  Surgical Assistant: Kathryn Carr  Fellow: Lowell Alcantar MD    Anesthesia: General    Estimated Blood Loss (mL): less than 50     Complications: None    Specimens:   * No specimens in log *    Implants:  * No implants in log *      Drains: * No LDAs found *    Findings:  Infection Present At Time Of Surgery (PATOS) (choose all levels that have infection present):  No infection present  Other Findings: AC joint OA, revision distal clavicle excision    Electronically signed by Lowell Alcantar MD on 8/20/2024 at 12:50 PM

## 2024-08-20 NOTE — PROGRESS NOTES
8/14/24 @ 0955 Pt confirms saw Hematologist Dr. Melvin [phone] on 8/12 for Hemophilia Factor VIII and will be seeing PCP Dr. Scarlet Vazquez on 8/15 for PreOp. MD    8/14/24 @ 1000 Spoke with Dr. Sotomayor in regards to pt seeing Hematologist for Hemophilia Factor VIII with orders noted including \"following any recommendations from Hematologist for DOS\". MD    8/14/24 @ 1015 Spoke to Kaykay @ Hematologist Dr. Melvin office requesting notes from 8/12 visit and any recommendations for DOS. MD    8/14/24 @ 1030 Jumana from Dr. Melvin's office calls and confirms notes from 8/12 are being completed with recommendations and will be faxed to PAT# given with read back once complete. MD  
8/19/2024 2:48pm Hematologist recommendations for pre op have been received see in CE 8/15/2024.  Per Deedee at surgeon's office they will be placing the order for Xyntha to be given 30-60 minutes prior to surgery and post op Factor VIII level 15 minutes after infusion. I confirmed with hospital pharmacist, Ronald, that we do have that medication available. -db  
Ambulatory Surgery/Procedure Discharge Note    Vitals:    08/20/24 1409   BP: 120/79   Pulse: 63   Resp: 18   Temp: 97 °F (36.1 °C)   SpO2: 95%       In: 1810 [P.O.:360; I.V.:1400]  Out: 10     Restroom use offered before discharge.  Yes    Pain assessment:  none  Pain Level: 0    Patient denies pain. Patient has numbness in left arm d/t nerve block. Dressing is clean, dry and intact. Patient sent home with additional dressing change supplies. VSS. Patient tolerating all PO intake. Patient offered to void before discharge. Discharge instructions given to patient and patients wife. Patient is ready for discharge.    Patient discharged to home/self care. Patient discharged via wheel chair by transporter to waiting family/S.O.       8/20/2024 3:04 PM  
PACU Transfer to [facility]    Vitals:    08/20/24 1400   BP: 128/89   Pulse: 61   Resp: 18   Temp: 97.3 °F (36.3 °C)   SpO2: 98%         Intake/Output Summary (Last 24 hours) at 8/20/2024 1406  Last data filed at 8/20/2024 1400  Gross per 24 hour   Intake 1490 ml   Output 10 ml   Net 1480 ml       Pain assessment:  present - adequately treated  Pain Level: 0    Patient transferred to care of GAMAL RN.    8/20/2024 2:06 PM     
Procedure: brachial plexus block  MD: Dr. Rosales  Timeout performed.  Pt monitored closely on heart monitor, 2L NC, continuous pulse oximetry, EtCO2, and frequent BPs.   Pt remained alert and oriented x4. pt tolerated procedure well.  Electronically signed by Camila Powell RN on 8/20/2024 at 11:42 AM    
Pt arrived animated talking, no report of pain or nausea able to wiggle fingers. Some numbness unable to arm. Report received from CRNA   
eating/ drinking as you will have very specific instructions to follow.  If you did not receive these, call your surgeon's office immediately.     5. MEDICATIONS:  Take the following medications with a SMALL sip of water: Atenolol  Use your usual dose of inhalers the morning of surgery. BRING your rescue inhaler with you to hospital.   Anesthesia does NOT want you to take insulin the morning of surgery. They will control your blood sugar while you are at the hospital. Please contact your ordering physician for instructions regarding your insulin the night before your procedure. If you have an insulin pump, please keep it set on basal rate.   Bariatric patient's call your surgeon if on diabetic medications as some may need to be stopped 1 week prior to surgery    6. Do not swallow additional water when brushing teeth. No gum, candy, mints, or ice chips. Refrain from smoking or at least decrease the amount on day of surgery.    7. Morning of surgery:   Take a shower with an antibacterial soap (i.e., Safeguard or Dial) OR your physician may have instructed you to use Hibiclens.  Dress in loose, comfortable clothing appropriate for redressing after your procedure.   Do not wear jewelry (including body piercings), make-up (especially NO eye make-up), fingernail polish (NO toenail polish if foot/leg surgery), lotion, powders, or metal hairclips.   Do not shave or wax for 72 hours prior to procedure near your operative site. Shaving with a razor can irritate your skin and make it easier to develop an infection. On the day of your procedure, any hair that needs to be removed near the surgical site will be 'clipped' by a healthcare worker using a special clipper designed to avoid skin irritation.    8. Dentures, glasses, or contacts will need to be removed before your procedure. Bring cases for your glasses, contacts, dentures, or hearing aids to protect them while you are in surgery.      9. If you use a CPAP, please bring

## 2024-08-20 NOTE — ANESTHESIA PRE PROCEDURE
CHOLECYSTECTOMY     • FOOT SURGERY Right     X 3   • HAND SURGERY      x3   • HARDWARE REMOVAL FOOT / ANKLE Right 12/27/2018    REMOVAL OF  SCREW  RIGHT HALLUX performed by Lisa Moore DPM at [facility] OR   • KNEE ARTHROSCOPY Right     x7   • NOSE SURGERY     • OSTEOTOMY Right 12/27/2018    PROXIMAL PHALANX OSTECTOMY WITHOUT FIXATION RIGHT FOOT performed by Lisa Moore DPM at [facility] OR   • CT REMOVAL IMPLANT DEEP Right 10/04/2018    FOOT HARDWARE REMOVAL RIGHT HALLUX, 2 SCREWS--SLEEP APNEA performed by Lisa Moore DPM at [facility] OR   • SHOULDER ARTHROSCOPY Left 03/21/2016    SAD, Debridement, GASTON, Open BT       Social History:    Social History     Tobacco Use   • Smoking status: Never   • Smokeless tobacco: Never   Substance Use Topics   • Alcohol use: Yes     Alcohol/week: 6.0 standard drinks of alcohol     Types: 6 Cans of beer per week     Comment: Occassionally                                Counseling given: Not Answered      Vital Signs (Current):   Vitals:    08/14/24 0922 08/20/24 0902   BP:  (!) 142/90   Pulse:  (!) 43   Resp:  14   Temp:  97.3 °F (36.3 °C)   TempSrc:  Temporal   SpO2:  98%   Weight: 95.3 kg (210 lb) 97.2 kg (214 lb 3.2 oz)   Height: 1.88 m (6' 2\") 1.88 m (6' 2.02\")                                              BP Readings from Last 3 Encounters:   08/20/24 (!) 142/90   09/28/23 138/81   12/27/18 (!) 138/99       NPO Status: Time of last liquid consumption: 2000                        Time of last solid consumption: 1900                        Date of last liquid consumption: 08/19/24                        Date of last solid food consumption: 08/19/24    BMI:   Wt Readings from Last 3 Encounters:   08/20/24 97.2 kg (214 lb 3.2 oz)   08/08/24 96.2 kg (212 lb)   04/11/24 96.2 kg (212 lb)     Body mass index is 27.49 kg/m².    CBC:   Lab Results   Component Value Date/Time    WBC 7.3 08/20/2024 09:35 AM    RBC 4.60 08/20/2024 09:35 AM    HGB 15.1 08/20/2024 09:35 AM    HCT

## 2024-08-20 NOTE — INTERVAL H&P NOTE
Update History & Physical    The patient's History and Physical of August 8, 2024 was reviewed with the patient and I examined the patient. There was no change. The surgical site was confirmed by the patient and me.     Plan: The risks, benefits, expected outcome, and alternative to the recommended procedure have been discussed with the patient. Patient understands and wants to proceed with the procedure.     Electronically signed by Lowell Alcantar MD on 8/20/2024 at 9:06 AM

## 2024-08-20 NOTE — ANESTHESIA POSTPROCEDURE EVALUATION
Department of Anesthesiology  Postprocedure Note    Patient: Facundo Hartley  MRN: [id number]  YOB: 1960  Date of evaluation: 8/20/2024    Procedure Summary       Date: 08/20/24 Room / Location: [address]    Anesthesia Start: 1141 Anesthesia Stop: 1316    Procedure: LEFT SHOULDER EXAMINATION UNDER ANESTHESIA, DIAGNOSTIC SCOPE, SCOPE EXTENSIVE DEBRIDEMENT, REVISION DECOMPRESSION, ARTHROSCOPY DISTAL CLAVICLE EXCISION, INJECTION OF CORTICOSTEROID (Left: Shoulder) Diagnosis:       Left shoulder pain      (Left shoulder pain [M25.512])    Surgeons: Wally Archibald MD Responsible Provider: Sterling Rosales MD    Anesthesia Type: general ASA Status: 4            Anesthesia Type: No value filed.    Robert Phase I: Robert Score: 10    Robert Phase II:      Anesthesia Post Evaluation    Patient location during evaluation: PACU  Patient participation: complete - patient participated  Level of consciousness: awake and alert  Airway patency: patent  Nausea & Vomiting: no nausea and no vomiting  Cardiovascular status: hemodynamically stable  Respiratory status: acceptable  Hydration status: euvolemic  Multimodal analgesia pain management approach  Pain management: satisfactory to patient    No notable events documented.

## 2024-08-20 NOTE — ANESTHESIA PROCEDURE NOTES
Peripheral Block    Patient location during procedure: pre-op  Reason for block: post-op pain management and at surgeon's request  Start time: 8/20/2024 11:26 AM  End time: 8/20/2024 11:32 AM  Staffing  Performed: anesthesiologist and resident/CRNA   Anesthesiologist: Sterling Rosales MD  Resident/CRNA: Vee Suggs APRN - CRNA  Performed by: Sterling Rosales MD  Authorized by: Sterling Rosales MD    Preanesthetic Checklist  Completed: patient identified, IV checked, site marked, risks and benefits discussed, surgical/procedural consents, equipment checked, pre-op evaluation, timeout performed, anesthesia consent given, oxygen available and monitors applied/VS acknowledged  Peripheral Block   Patient position: sitting  Prep: ChloraPrep  Provider prep: mask and sterile gloves  Patient monitoring: cardiac monitor, continuous pulse ox, frequent blood pressure checks and IV access  Block type: Brachial plexus  Interscalene  Laterality: left  Injection technique: single-shot  Guidance: ultrasound guided  Local infiltration: lidocaine  Infiltration strength: 1 %  Local infiltration: lidocaine  Dose: 3 mL    Needle   Needle gauge: 21 G  Needle localization: ultrasound guidance  Needle length: 10 cm  Assessment   Injection assessment: negative aspiration for heme, no paresthesia on injection and local visualized surrounding nerve on ultrasound  Paresthesia pain: none  Slow fractionated injection: yes  Hemodynamics: stable    Additional Notes  Immediately prior to procedure a \"time out\" was called to verify the correct patient, allergies, laterality, procedure and equipment. Time out performed with Camila BOLES    Local Anesthetic: 0.5 %  Bupivacaine   Amount: 30 ml  in 5 ml increments after negative aspiration each time. Exparel 10 ml added to block    Anterior scalene and middle scalene muscles, upper, middle and lower trunks of the brachial plexus are identified, the tip of the needle and the spread of the

## 2024-08-20 NOTE — DISCHARGE INSTRUCTIONS
daytime rest during your recovery at home.  Surgery and Anesthesia place a significant amount of stress on your body.  Using your CPAP will help keep you safe and lessen the negative effects of that stress.    FOLLOW-UP RECOVERY CARE:  [x]Call the office at  for follow-up appointment and problems    Watch for these possible complications, symptoms, or side effects of anesthesia.  Call physician if they or any other problems occur:  Signs of INFECTION   > Fever over 101°     > Redness, swelling, hardness or warmth at the operative site   >Foul smelling or cloudy drainage at the operative site   Unrelieved PAIN  Unrelieved NAUSEA  Blood soaked dressing.  (Some oozing may be normal)  Inability to urinate      Numb, pale, blue, cold or tingling extremity      Physician:  Colton     The above instructions were reviewed with patient/significant other.  The following additional patient specific information was reviewed with the patient/significant other:  [x]Procedure/physician specific instructions  [x]FAQ Surgical Site Infections                  Date/time 8/20/2024 1:55 PM                 If you smoke STOP. We care about your health!

## 2024-08-21 LAB — FACT VIII ACT/NOR PPP: 57 % (ref 50–150)

## 2024-08-21 NOTE — OP NOTE
small osteophyte, we extended further back our resection zone.  We irrigated copiously, removed all bony debris.  Under direct visualization, we percutaneously placed 18-gauge spinal needle between the anterior and lateral portals for corticosteroid injection.  We removed the arthroscopic and closed the arthroscopic portals, anterolateral and posterior using 4-0 Monocryl subcuticular closure and Dermabond type dressing.  Copiously infiltrated with 80 mg of Depo-Medrol and 5 mL Marcaine with epinephrine in the subacromial space and under the AC joint for postoperative analgesia.  We withdrew the needle.  A sterile compressive dressing was applied.  The arm was placed in a padded soft brace.  The patient was repositioned in the supine position before this and promptly awakened from anesthesia and taken from the operating room to the recovery room in satisfactory condition.    PLAN:  The patient will be discharged on oral analgesics with instructions to begin outpatient physical therapy and follow up in the office in 1 week.          NICK FAJARDO MD      D:  08/20/2024 19:45:56     T:  08/21/2024 02:33:16     TIMO/GILBERT  Job #:  [id number]     Doc#:  [id number]

## 2024-08-26 ENCOUNTER — TELEPHONE (OUTPATIENT)
Dept: ORTHOPEDIC SURGERY | Age: 64
End: 2024-08-26

## 2024-08-26 ENCOUNTER — EVALUATION (OUTPATIENT)
Dept: PHYSICAL THERAPY | Age: 64
End: 2024-08-26
Payer: MEDICARE

## 2024-08-26 DIAGNOSIS — M19.012 PRIMARY OSTEOARTHRITIS, LEFT SHOULDER: ICD-10-CM

## 2024-08-26 DIAGNOSIS — M19.012 ARTHRITIS OF LEFT ACROMIOCLAVICULAR JOINT: Primary | ICD-10-CM

## 2024-08-26 DIAGNOSIS — M25.512 LEFT SHOULDER PAIN, UNSPECIFIED CHRONICITY: Primary | ICD-10-CM

## 2024-08-26 DIAGNOSIS — M25.612 DECREASED ROM OF LEFT SHOULDER: ICD-10-CM

## 2024-08-26 PROCEDURE — 97110 THERAPEUTIC EXERCISES: CPT | Performed by: PHYSICAL THERAPIST

## 2024-08-26 PROCEDURE — 97112 NEUROMUSCULAR REEDUCATION: CPT | Performed by: PHYSICAL THERAPIST

## 2024-08-26 PROCEDURE — 97161 PT EVAL LOW COMPLEX 20 MIN: CPT | Performed by: PHYSICAL THERAPIST

## 2024-08-26 RX ORDER — HYDROCODONE BITARTRATE AND ACETAMINOPHEN 5; 325 MG/1; MG/1
1 TABLET ORAL EVERY 6 HOURS PRN
Qty: 20 TABLET | Refills: 0 | Status: SHIPPED | OUTPATIENT
Start: 2024-08-26 | End: 2024-09-02

## 2024-08-26 NOTE — TELEPHONE ENCOUNTER
Prescription Refill     Medication Name:  HYDROCODONE  Pharmacy: [facility] [place]  Patient Contact Number:  [phone]

## 2024-08-26 NOTE — PROGRESS NOTES
performed to prevent loss of range of motion, maintain or improve muscular strength or increase flexibility, following either an injury or surgery.  (60808) NEUROMUSCULAR RE-EDUCATION - Therapeutic procedure, 1 or more areas, each 15 minutes; neuromuscular reeducation of movement, balance, coordination, kinesthetic sense, posture, and/or proprioception for sitting and/or standing activities  (52884) HOME EXERCISE PROGRAM - Reviewed/Progressed HEP activities related to neuromuscular reeducation of movement, balance, coordination, kinesthetic sense, posture, and/or proprioception for sitting and/or standing activities      GOALS     Patient stated goal: Patient will return to the gym and complete weight lifting exercises without increase in symptoms in L arm.   [] Progressing: [] Met: [] Not Met: [] Adjusted    Therapist goals for Patient:   Short Term Goals: To be achieved in: 2 weeks  1. Independent in HEP and progression per patient tolerance, in order to prevent re-injury.   [] Progressing: [] Met: [] Not Met: [] Adjusted  2. Patient will have a decrease in pain to <3/10 to facilitate improvement in movement, function, and ADLs as indicated by Functional Deficits.  [] Progressing: [] Met: [] Not Met: [] Adjusted    Long Term Goals: To be achieved in: 8 weeks  1. Disability index score of 20% or less for the Quick DASH to assist with reaching prior level of function with activities such as bathing, dressing, and sleeping.  [] Progressing: [] Met: [] Not Met: [] Adjusted  2. Patient will demonstrate increased AROM of L shoulder flexion, abduction, ER, and IR to >160°, >160°, T2, and T10 respectively without pain to allow for proper joint functioning to enable patient to return to [facility] for ADLs.   [] Progressing: [] Met: [] Not Met: [] Adjusted  3. Patient will demonstrate increased Strength of L shoulder flexion, abduction, ER, and IR to at least 4+/5 throughout without pain to allow for proper functional mobility  [id number]  [place] PT license: [id number]      Note: Portions of this note have been templated and/or copied from initial evaluation, reassessments and prior notes for documentation efficiency.    Note: If patient does not return for scheduled/recommended follow up visits, this note will serve as a discharge from care along with the most recent update on progress.

## 2024-08-29 ENCOUNTER — OFFICE VISIT (OUTPATIENT)
Dept: ORTHOPEDIC SURGERY | Age: 64
End: 2024-08-29

## 2024-08-29 DIAGNOSIS — Z98.890 S/P SHOULDER SURGERY: Primary | ICD-10-CM

## 2024-08-29 DIAGNOSIS — M19.012 ARTHRITIS OF LEFT ACROMIOCLAVICULAR JOINT: ICD-10-CM

## 2024-08-29 DIAGNOSIS — M19.019 AC JOINT ARTHROPATHY: ICD-10-CM

## 2024-08-29 PROCEDURE — 99024 POSTOP FOLLOW-UP VISIT: CPT | Performed by: ORTHOPAEDIC SURGERY

## 2024-08-29 NOTE — PROGRESS NOTES
History of Present Illness:  Facundo Hartley is a 64 y.o. male who presents for a post operative visit.  The patient underwent a left arthroscopic revision subacromial decompression with acromioplasty; arthroscopic distal clavicle excision; injection of corticosteroid on 8/20/2024 by Dr. Wally Archibald.  Overall he is doing okay and feels that their pain is well controlled with current pain medications.  He has been compliant with wearing the UltraSling brace at all times. The patient denies any fevers, chills, numbness, tingling, and shortness of breath.    Medical History:  Patient's medications, allergies, past medical, surgical, social and family histories were reviewed and updated as appropriate.    Review of Systems  A 14 point review of systems was completed by the patient is available in the media section of the scanned medical record and was reviewed on 8/29/2024.      Vital Signs:  There were no vitals filed for this visit.    General/Appearance: Alert and oriented and in no apparent distress.    Skin:  There are no skin lesions, cellulitis, or extreme edema. The patient has warm and well-perfused Bilateral upper extremities with brisk capillary refill.      right Shoulder Exam:    Inspection: right shoulder incision that is clean, dry and intact and well approximated.  The Prineo dressing is still in place.  Mild ecchymosis and swelling are present as can be expected. There is no erythema, drainage or other signs of infection    Palpation:  No crepitus to gentle motion    Active Range of Motion: Deferred    Passive Range of Motion:  Forward elevation to 110 with soft endpoint    Strength:  Deferred    Special Tests:  Deferred.    Neurovascular: Sensation to light touch is intact, no motor deficits, palpable radial pulses 2+    Radiology:     Plain radiographs not obtained.        Assessment :  Mr. Facundo Hartley is a 64 y.o. patient arthroscopic revision subacromial decompression with acromioplasty;

## 2024-09-03 ENCOUNTER — TREATMENT (OUTPATIENT)
Dept: PHYSICAL THERAPY | Age: 64
End: 2024-09-03
Payer: MEDICARE

## 2024-09-03 DIAGNOSIS — M25.512 LEFT SHOULDER PAIN, UNSPECIFIED CHRONICITY: Primary | ICD-10-CM

## 2024-09-03 DIAGNOSIS — M19.012 PRIMARY OSTEOARTHRITIS, LEFT SHOULDER: ICD-10-CM

## 2024-09-03 DIAGNOSIS — M25.612 DECREASED ROM OF LEFT SHOULDER: ICD-10-CM

## 2024-09-03 PROCEDURE — 97112 NEUROMUSCULAR REEDUCATION: CPT | Performed by: PHYSICAL THERAPIST

## 2024-09-03 PROCEDURE — 97110 THERAPEUTIC EXERCISES: CPT | Performed by: PHYSICAL THERAPIST

## 2024-09-03 NOTE — PROGRESS NOTES
[facility]    [address] [place] [address]   P:[phone]  F:[phone]         Physical Therapy: TREATMENT/PROGRESS NOTE   Patient: Facundo Hartley (64 y.o. male)   Examination Date: 2024   :  1960 MRN: [id number]   Visit #: 2   Insurance Allowable Auth Needed   TBD [x]Yes    []No    Insurance: Payor: HUMANA MEDICARE / Plan: HUMANA CHOICE-PPO MEDICARE / Product Type: *No Product type* /   Insurance ID: [id number] - (Medicare Managed)  Secondary Insurance (if applicable):    Treatment Diagnosis:     ICD-10-CM    1. Left shoulder pain, unspecified chronicity  M25.512       2. Primary osteoarthritis, left shoulder  M19.012       3. Decreased ROM of left shoulder  M25.612          Medical Diagnosis:  No admission diagnoses are documented for this encounter.   Referring Physician: Wally Archibald MD  PCP: Scarlet Vazquez     Plan of care signed (Y/N): Y    Date of Patient follow up with Physician: 2 weeks     Plan of Care Report: NO  POC update due: (10 visits /OR AUTH LIMITS, whichever is less)  2024                                             Medical History:  Comorbidities:  Diabetes (Type I or II)  Osteoarthritis  Prior Surgeries: L SAD and biceps tenodesis ()  Other: neuropathy  Relevant Medical History: OA, Prior SAD and biceps tenodesis, HTN                                         Precautions/ Contra-indications:           Latex allergy:  NO  Pacemaker:    NO  Contraindications for Manipulation: recent surgical history (relative)  Date of Surgery: 2024  Other:    Red Flags:  None    Suicide Screening:   The patient did not verbalize a primary behavioral concern, suicidal ideation, suicidal intent, or demonstrate suicidal behaviors.    Preferred Language for Healthcare:   [x] English       [] other:    SUBJECTIVE EXAMINATION     Patient stated complaint: Pt saw Dr. Archibald last Thursday and he was pleased with his

## 2024-09-09 ENCOUNTER — TELEPHONE (OUTPATIENT)
Dept: ORTHOPEDIC SURGERY | Age: 64
End: 2024-09-09

## 2024-09-09 NOTE — TELEPHONE ENCOUNTER
Surgery and/or Procedure Scheduling     Contact Name: Hartley Facundo CM   Surgical/Procedure Request: Post Op Visit 09/12/24   Patient Contact Number: [phone]     Patient called to cancel his Post Op Visit on 09/12/24 due to him going out of town but I rescheduled this patient for 09/19/24 @ the [place], [place] Location at 11:30 am   Just a FYI

## 2024-09-10 ENCOUNTER — TREATMENT (OUTPATIENT)
Dept: PHYSICAL THERAPY | Age: 64
End: 2024-09-10
Payer: MEDICARE

## 2024-09-10 DIAGNOSIS — M25.612 DECREASED ROM OF LEFT SHOULDER: ICD-10-CM

## 2024-09-10 DIAGNOSIS — M25.512 LEFT SHOULDER PAIN, UNSPECIFIED CHRONICITY: Primary | ICD-10-CM

## 2024-09-10 DIAGNOSIS — M19.012 PRIMARY OSTEOARTHRITIS, LEFT SHOULDER: ICD-10-CM

## 2024-09-10 PROCEDURE — 97112 NEUROMUSCULAR REEDUCATION: CPT | Performed by: PHYSICAL THERAPIST

## 2024-09-10 PROCEDURE — 97110 THERAPEUTIC EXERCISES: CPT | Performed by: PHYSICAL THERAPIST

## 2024-09-10 PROCEDURE — 97530 THERAPEUTIC ACTIVITIES: CPT | Performed by: PHYSICAL THERAPIST

## 2024-09-19 ENCOUNTER — OFFICE VISIT (OUTPATIENT)
Dept: ORTHOPEDIC SURGERY | Age: 64
End: 2024-09-19

## 2024-09-19 VITALS — WEIGHT: 214 LBS | BODY MASS INDEX: 27.46 KG/M2 | HEIGHT: 74 IN

## 2024-09-19 DIAGNOSIS — M19.019 AC JOINT ARTHROPATHY: ICD-10-CM

## 2024-09-19 DIAGNOSIS — Z98.890 S/P ARTHROSCOPY OF LEFT SHOULDER: Primary | ICD-10-CM

## 2024-09-19 DIAGNOSIS — M19.012 ARTHRITIS OF LEFT ACROMIOCLAVICULAR JOINT: ICD-10-CM

## 2024-09-19 PROCEDURE — 99024 POSTOP FOLLOW-UP VISIT: CPT | Performed by: ORTHOPAEDIC SURGERY

## 2024-09-19 RX ORDER — DIAZEPAM 5 MG
TABLET ORAL
COMMUNITY
Start: 2024-07-10

## 2024-09-19 RX ORDER — ROPINIROLE 0.5 MG/1
0.5 TABLET, FILM COATED ORAL NIGHTLY
COMMUNITY
Start: 2024-08-26

## 2024-09-24 ENCOUNTER — TREATMENT (OUTPATIENT)
Dept: PHYSICAL THERAPY | Age: 64
End: 2024-09-24
Payer: MEDICARE

## 2024-09-24 DIAGNOSIS — M19.012 PRIMARY OSTEOARTHRITIS, LEFT SHOULDER: ICD-10-CM

## 2024-09-24 DIAGNOSIS — M25.612 DECREASED ROM OF LEFT SHOULDER: ICD-10-CM

## 2024-09-24 DIAGNOSIS — M25.512 LEFT SHOULDER PAIN, UNSPECIFIED CHRONICITY: Primary | ICD-10-CM

## 2024-09-24 PROCEDURE — 97112 NEUROMUSCULAR REEDUCATION: CPT | Performed by: PHYSICAL THERAPIST

## 2024-09-24 PROCEDURE — 97110 THERAPEUTIC EXERCISES: CPT | Performed by: PHYSICAL THERAPIST

## 2024-09-24 PROCEDURE — 97530 THERAPEUTIC ACTIVITIES: CPT | Performed by: PHYSICAL THERAPIST

## 2024-10-02 ENCOUNTER — TREATMENT (OUTPATIENT)
Dept: PHYSICAL THERAPY | Age: 64
End: 2024-10-02
Payer: MEDICARE

## 2024-10-02 DIAGNOSIS — M25.512 LEFT SHOULDER PAIN, UNSPECIFIED CHRONICITY: Primary | ICD-10-CM

## 2024-10-02 DIAGNOSIS — M25.612 DECREASED ROM OF LEFT SHOULDER: ICD-10-CM

## 2024-10-02 DIAGNOSIS — M19.012 PRIMARY OSTEOARTHRITIS, LEFT SHOULDER: ICD-10-CM

## 2024-10-02 PROCEDURE — 97530 THERAPEUTIC ACTIVITIES: CPT | Performed by: PHYSICAL THERAPIST

## 2024-10-02 PROCEDURE — 97112 NEUROMUSCULAR REEDUCATION: CPT | Performed by: PHYSICAL THERAPIST

## 2024-10-02 PROCEDURE — 97110 THERAPEUTIC EXERCISES: CPT | Performed by: PHYSICAL THERAPIST

## 2024-10-02 NOTE — PROGRESS NOTES
PLAN     Frequency/Duration: 1-2x/week for 4-6 weeks for the following treatment interventions:    Interventions:  Therapeutic Exercise (53648) including: strength training, ROM, and functional mobility  Therapeutic Activities (31088) including: functional mobility training and education.  Neuromuscular Re-education (36899) activation and proprioception, including postural re-education.    Manual Therapy (81359) as indicated to include: Passive Range of Motion, Gr I-IV mobilizations, Soft Tissue Mobilization, Dry Needling/IASTM, and Trigger Point Release  Modalities as needed that may include: Cryotherapy, Electrical Stimulation, and Thermal Agents  Patient education on joint protection, postural re-education, activity modification, and progression of HEP    Plan: Cont POC- Continue emphasis/focus on exercise progression, improving proper muscle recruitment and activation/motor control patterns, modulating pain, increasing ROM, and improving postural awareness. Next visit plan to continue current phase     Electronically Signed by Pau Varela, PT, DPT, OCS, OMT-C      Date: 10/02/2024        Board-Certified Orthopaedic Clinical Specialist    [place] PT license: [id number]  [place] PT license: [id number]               Note: Portions of this note have been templated and/or copied from initial evaluation, reassessments and prior notes for documentation efficiency.    Note: If patient does not return for scheduled/recommended follow up visits, this note will serve as a discharge from care along with the most recent update on progress.

## 2024-10-03 ENCOUNTER — TELEPHONE (OUTPATIENT)
Dept: ORTHOPEDIC SURGERY | Age: 64
End: 2024-10-03

## 2024-10-03 ENCOUNTER — OFFICE VISIT (OUTPATIENT)
Dept: ORTHOPEDIC SURGERY | Age: 64
End: 2024-10-03

## 2024-10-03 DIAGNOSIS — Z98.890 S/P SHOULDER SURGERY: ICD-10-CM

## 2024-10-03 DIAGNOSIS — Z98.890 S/P ARTHROSCOPY OF LEFT SHOULDER: Primary | ICD-10-CM

## 2024-10-03 PROCEDURE — 99024 POSTOP FOLLOW-UP VISIT: CPT | Performed by: ORTHOPAEDIC SURGERY

## 2024-10-03 RX ORDER — METHYLPREDNISOLONE 4 MG
TABLET, DOSE PACK ORAL
Qty: 1 KIT | Refills: 0 | Status: SHIPPED | OUTPATIENT
Start: 2024-10-03 | End: 2024-10-09
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
## 2024-10-08 ENCOUNTER — TREATMENT (OUTPATIENT)
Dept: PHYSICAL THERAPY | Age: 64
End: 2024-10-08
Payer: MEDICARE

## 2024-10-08 DIAGNOSIS — M25.512 LEFT SHOULDER PAIN, UNSPECIFIED CHRONICITY: Primary | ICD-10-CM

## 2024-10-08 DIAGNOSIS — M25.612 DECREASED ROM OF LEFT SHOULDER: ICD-10-CM

## 2024-10-08 DIAGNOSIS — M19.012 PRIMARY OSTEOARTHRITIS, LEFT SHOULDER: ICD-10-CM

## 2024-10-08 PROCEDURE — 97530 THERAPEUTIC ACTIVITIES: CPT | Performed by: PHYSICAL THERAPIST

## 2024-10-08 PROCEDURE — 97112 NEUROMUSCULAR REEDUCATION: CPT | Performed by: PHYSICAL THERAPIST

## 2024-10-08 PROCEDURE — 97110 THERAPEUTIC EXERCISES: CPT | Performed by: PHYSICAL THERAPIST

## 2024-10-08 NOTE — PROGRESS NOTES
[facility]    [address] [place] [address]   P:[phone]  F:[phone]           Physical Therapy: TREATMENT/PROGRESS NOTE   Patient: Facundo Hartley (64 y.o. male)   Examination Date: 10/08/2024   :  1960 MRN: [id number]   Visit #: 6   Insurance Allowable Auth Needed   10 [x]Yes    []No    Insurance: Payor: HUMANA MEDICARE / Plan: HUMANA CHOICE-PPO MEDICARE / Product Type: *No Product type* /   Insurance ID: [id number] - (Medicare Managed)  Secondary Insurance (if applicable):    Treatment Diagnosis:     ICD-10-CM    1. Left shoulder pain, unspecified chronicity  M25.512       2. Primary osteoarthritis, left shoulder  M19.012       3. Decreased ROM of left shoulder  M25.612          Medical Diagnosis:  No admission diagnoses are documented for this encounter.   Referring Physician: Wally Archibald MD  PCP: Scarlet Vazquez     Plan of care signed (Y/N): Y    Date of Patient follow up with Physician: 10/17/2024     Plan of Care Report: NO  POC update due: (10 visits /OR AUTH LIMITS, whichever is less) 2024                                             Medical History:  Comorbidities:  Diabetes (Type I or II)  Osteoarthritis  Prior Surgeries: L SAD and biceps tenodesis ()  Other: neuropathy  Relevant Medical History: OA, Prior SAD and biceps tenodesis, HTN                                         Precautions/ Contra-indications:           Latex allergy:  NO  Pacemaker:    NO  Contraindications for Manipulation: recent surgical history (relative)  Date of Surgery: 2024  Other:    Red Flags:  None    Suicide Screening:   The patient did not verbalize a primary behavioral concern, suicidal ideation, suicidal intent, or demonstrate suicidal behaviors.    Preferred Language for Healthcare:   [x] English       [] other:    SUBJECTIVE EXAMINATION     Patient stated complaint: Patient reports that he ended up getting in to see Dr. Archibald

## 2024-10-16 ENCOUNTER — TREATMENT (OUTPATIENT)
Dept: PHYSICAL THERAPY | Age: 64
End: 2024-10-16
Payer: MEDICARE

## 2024-10-16 DIAGNOSIS — M25.612 DECREASED ROM OF LEFT SHOULDER: ICD-10-CM

## 2024-10-16 DIAGNOSIS — M25.512 LEFT SHOULDER PAIN, UNSPECIFIED CHRONICITY: Primary | ICD-10-CM

## 2024-10-16 DIAGNOSIS — M19.012 PRIMARY OSTEOARTHRITIS, LEFT SHOULDER: ICD-10-CM

## 2024-10-16 PROCEDURE — 97110 THERAPEUTIC EXERCISES: CPT | Performed by: PHYSICAL THERAPIST

## 2024-10-16 PROCEDURE — 97112 NEUROMUSCULAR REEDUCATION: CPT | Performed by: PHYSICAL THERAPIST

## 2024-10-16 PROCEDURE — 97530 THERAPEUTIC ACTIVITIES: CPT | Performed by: PHYSICAL THERAPIST

## 2024-10-16 NOTE — PROGRESS NOTES
[facility]    [address] [place] [address]   P:[phone]  F:[phone]           Physical Therapy: TREATMENT/PROGRESS NOTE   Patient: Facundo Hartley (64 y.o. male)   Examination Date: 10/16/2024   :  1960 MRN: [id number]   Visit #: 7   Insurance Allowable Auth Needed   10 [x]Yes    []No    Insurance: Payor: HUMANA MEDICARE / Plan: HUMANA CHOICE-PPO MEDICARE / Product Type: *No Product type* /   Insurance ID: [id number] - (Medicare Managed)  Secondary Insurance (if applicable):    Treatment Diagnosis:     ICD-10-CM    1. Left shoulder pain, unspecified chronicity  M25.512       2. Primary osteoarthritis, left shoulder  M19.012       3. Decreased ROM of left shoulder  M25.612          Medical Diagnosis:  No admission diagnoses are documented for this encounter.   Referring Physician: Wally Archibald MD  PCP: Scarlet Vazquez     Plan of care signed (Y/N): Y    Date of Patient follow up with Physician: 10/17/2024     Plan of Care Report: NO  POC update due: (10 visits /OR AUTH LIMITS, whichever is less) 2024                                             Medical History:  Comorbidities:  Diabetes (Type I or II)  Osteoarthritis  Prior Surgeries: L SAD and biceps tenodesis ()  Other: neuropathy  Relevant Medical History: OA, Prior SAD and biceps tenodesis, HTN                                         Precautions/ Contra-indications:           Latex allergy:  NO  Pacemaker:    NO  Contraindications for Manipulation: recent surgical history (relative)  Date of Surgery: 2024  Other:    Red Flags:  None    Suicide Screening:   The patient did not verbalize a primary behavioral concern, suicidal ideation, suicidal intent, or demonstrate suicidal behaviors.    Preferred Language for Healthcare:   [x] English       [] other:    SUBJECTIVE EXAMINATION     Patient stated complaint: Patient reports that he is still having significant pain in the

## 2024-10-17 ENCOUNTER — OFFICE VISIT (OUTPATIENT)
Dept: ORTHOPEDIC SURGERY | Age: 64
End: 2024-10-17

## 2024-10-17 VITALS — BODY MASS INDEX: 27.46 KG/M2 | WEIGHT: 214 LBS | HEIGHT: 74 IN

## 2024-10-17 DIAGNOSIS — Z98.890 S/P ARTHROSCOPY OF LEFT SHOULDER: Primary | ICD-10-CM

## 2024-10-17 PROCEDURE — 99024 POSTOP FOLLOW-UP VISIT: CPT | Performed by: PHYSICIAN ASSISTANT
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
## 2024-10-21 ENCOUNTER — TELEPHONE (OUTPATIENT)
Dept: ORTHOPEDIC SURGERY | Age: 64
End: 2024-10-21

## 2024-10-21 DIAGNOSIS — Z98.890 S/P ARTHROSCOPY OF LEFT SHOULDER: Primary | ICD-10-CM

## 2024-10-21 RX ORDER — OXYCODONE AND ACETAMINOPHEN 5; 325 MG/1; MG/1
1 TABLET ORAL EVERY 6 HOURS PRN
Qty: 12 TABLET | Refills: 0 | Status: SHIPPED | OUTPATIENT
Start: 2024-10-21 | End: 2024-10-24

## 2024-10-21 NOTE — TELEPHONE ENCOUNTER
General Question     Subject: PAIN MEDICATION  Patient and /or Facility Request: Facundo Hartley   Contact Number: [phone]     PATIENT CALLED TO REQUEST PAIN MEDICATION SENT TO [facility] IN [place] REQUESTED DURING LAST VISIT

## 2024-10-28 ENCOUNTER — TELEPHONE (OUTPATIENT)
Dept: ORTHOPEDIC SURGERY | Age: 64
End: 2024-10-28

## 2024-10-28 DIAGNOSIS — M19.019 AC JOINT ARTHROPATHY: ICD-10-CM

## 2024-10-28 DIAGNOSIS — M19.012 ARTHRITIS OF LEFT ACROMIOCLAVICULAR JOINT: ICD-10-CM

## 2024-10-28 DIAGNOSIS — Z98.890 S/P ARTHROSCOPY OF LEFT SHOULDER: Primary | ICD-10-CM

## 2024-10-28 NOTE — TELEPHONE ENCOUNTER
Prescription Refill     Medication Name:  HYDROCODONE   5 MG   Pharmacy: [facility]   Patient Contact Number:  [phone]

## 2024-10-29 ENCOUNTER — TREATMENT (OUTPATIENT)
Dept: PHYSICAL THERAPY | Age: 64
End: 2024-10-29
Payer: MEDICARE

## 2024-10-29 DIAGNOSIS — M25.512 LEFT SHOULDER PAIN, UNSPECIFIED CHRONICITY: Primary | ICD-10-CM

## 2024-10-29 DIAGNOSIS — M25.612 DECREASED ROM OF LEFT SHOULDER: ICD-10-CM

## 2024-10-29 DIAGNOSIS — M19.012 PRIMARY OSTEOARTHRITIS, LEFT SHOULDER: ICD-10-CM

## 2024-10-29 PROCEDURE — 97110 THERAPEUTIC EXERCISES: CPT | Performed by: PHYSICAL THERAPIST

## 2024-10-29 PROCEDURE — 97530 THERAPEUTIC ACTIVITIES: CPT | Performed by: PHYSICAL THERAPIST

## 2024-10-29 PROCEDURE — 97112 NEUROMUSCULAR REEDUCATION: CPT | Performed by: PHYSICAL THERAPIST

## 2024-10-29 NOTE — PROGRESS NOTES
[facility]    [address] [place] [address]   P:[phone]  F:[phone]           Physical Therapy: TREATMENT/PROGRESS NOTE   Patient: Facundo Hartley (64 y.o. male)   Examination Date: 10/29/2024   :  1960 MRN: [id number]   Visit #: 8   Insurance Allowable Auth Needed   10 [x]Yes    []No    Insurance: Payor: HUMANA MEDICARE / Plan: HUMANA CHOICE-PPO MEDICARE / Product Type: *No Product type* /   Insurance ID: [id number] - (Medicare Managed)  Secondary Insurance (if applicable):    Treatment Diagnosis:     ICD-10-CM    1. Left shoulder pain, unspecified chronicity  M25.512       2. Primary osteoarthritis, left shoulder  M19.012       3. Decreased ROM of left shoulder  M25.612          Medical Diagnosis:  No admission diagnoses are documented for this encounter.   Referring Physician: Wally Archibald MD  PCP: Scarlet Vazquez     Plan of care signed (Y/N): Y    Date of Patient follow up with Physician: 10/17/2024     Plan of Care Report: NO  POC update due: (10 visits /OR AUTH LIMITS, whichever is less) 2024                                             Medical History:  Comorbidities:  Diabetes (Type I or II)  Osteoarthritis  Prior Surgeries: L SAD and biceps tenodesis ()  Other: neuropathy  Relevant Medical History: OA, Prior SAD and biceps tenodesis, HTN                                         Precautions/ Contra-indications:           Latex allergy:  NO  Pacemaker:    NO  Contraindications for Manipulation: recent surgical history (relative)  Date of Surgery: 2024  Other:    Red Flags:  None    Suicide Screening:   The patient did not verbalize a primary behavioral concern, suicidal ideation, suicidal intent, or demonstrate suicidal behaviors.    Preferred Language for Healthcare:   [x] English       [] other:    SUBJECTIVE EXAMINATION     Patient stated complaint: Patient reports no change in the L shoulder pain. He notes that

## 2024-11-04 ENCOUNTER — TREATMENT (OUTPATIENT)
Dept: PHYSICAL THERAPY | Age: 64
End: 2024-11-04
Payer: MEDICARE

## 2024-11-04 DIAGNOSIS — M25.612 DECREASED ROM OF LEFT SHOULDER: ICD-10-CM

## 2024-11-04 DIAGNOSIS — M25.512 LEFT SHOULDER PAIN, UNSPECIFIED CHRONICITY: Primary | ICD-10-CM

## 2024-11-04 DIAGNOSIS — M19.012 PRIMARY OSTEOARTHRITIS, LEFT SHOULDER: ICD-10-CM

## 2024-11-04 PROCEDURE — 97112 NEUROMUSCULAR REEDUCATION: CPT | Performed by: PHYSICAL THERAPIST

## 2024-11-04 PROCEDURE — 97530 THERAPEUTIC ACTIVITIES: CPT | Performed by: PHYSICAL THERAPIST

## 2024-11-04 PROCEDURE — 97110 THERAPEUTIC EXERCISES: CPT | Performed by: PHYSICAL THERAPIST

## 2024-11-04 NOTE — PROGRESS NOTES
training, ROM, and functional mobility  Therapeutic Activities (83254) including: functional mobility training and education.  Neuromuscular Re-education (63930) activation and proprioception, including postural re-education.    Manual Therapy (99789) as indicated to include: Passive Range of Motion, Gr I-IV mobilizations, Soft Tissue Mobilization, Dry Needling/IASTM, and Trigger Point Release  Modalities as needed that may include: Cryotherapy, Electrical Stimulation, and Thermal Agents  Patient education on joint protection, postural re-education, activity modification, and progression of HEP    Plan: Cont POC- Continue emphasis/focus on exercise progression, improving proper muscle recruitment and activation/motor control patterns, modulating pain, increasing ROM, and improving postural awareness. Next visit plan to continue current phase     Electronically Signed by Pau Varela, PT, DPT, OCS, OMT-C      Date: 11/04/2024        Board-Certified Orthopaedic Clinical Specialist    [place] PT license: [id number]  [place] PT license: [id number]               Note: Portions of this note have been templated and/or copied from initial evaluation, reassessments and prior notes for documentation efficiency.    Note: If patient does not return for scheduled/recommended follow up visits, this note will serve as a discharge from care along with the most recent update on progress.

## 2024-11-11 ENCOUNTER — TREATMENT (OUTPATIENT)
Dept: PHYSICAL THERAPY | Age: 64
End: 2024-11-11
Payer: MEDICARE

## 2024-11-11 DIAGNOSIS — M19.012 PRIMARY OSTEOARTHRITIS, LEFT SHOULDER: ICD-10-CM

## 2024-11-11 DIAGNOSIS — M25.612 DECREASED ROM OF LEFT SHOULDER: ICD-10-CM

## 2024-11-11 DIAGNOSIS — M25.512 LEFT SHOULDER PAIN, UNSPECIFIED CHRONICITY: Primary | ICD-10-CM

## 2024-11-11 PROCEDURE — 97530 THERAPEUTIC ACTIVITIES: CPT | Performed by: PHYSICAL THERAPIST

## 2024-11-11 PROCEDURE — 97112 NEUROMUSCULAR REEDUCATION: CPT | Performed by: PHYSICAL THERAPIST

## 2024-11-11 PROCEDURE — 97110 THERAPEUTIC EXERCISES: CPT | Performed by: PHYSICAL THERAPIST

## 2024-11-11 NOTE — PROGRESS NOTES
[facility]    [address] [place] [address]   P:[phone]  F:[phone]         Physical Therapy: TREATMENT/PROGRESS NOTE   Patient: Facundo Hartley (64 y.o. male)   Examination Date: 2024   :  1960 MRN: [id number]   Visit #: 10   Insurance Allowable Auth Needed   20 [x]Yes    []No    Insurance: Payor: HUMANA MEDICARE / Plan: HUMANA CHOICE-PPO MEDICARE / Product Type: *No Product type* /   Insurance ID: [id number] - (Medicare Managed)  Secondary Insurance (if applicable):    Treatment Diagnosis:     ICD-10-CM    1. Left shoulder pain, unspecified chronicity  M25.512       2. Primary osteoarthritis, left shoulder  M19.012       3. Decreased ROM of left shoulder  M25.612          Medical Diagnosis:  No admission diagnoses are documented for this encounter.   Referring Physician: Wally Archibald MD  PCP: Scarlet Vazquez     Plan of care signed (Y/N): Y    Date of Patient follow up with Physician: 2024     Plan of Care Report: NO  POC update due: (10 visits /OR AUTH LIMITS, whichever is less) 2024                                             Medical History:  Comorbidities:  Diabetes (Type I or II)  Osteoarthritis  Prior Surgeries: L SAD and biceps tenodesis ()  Other: neuropathy  Relevant Medical History: OA, Prior SAD and biceps tenodesis, HTN                                         Precautions/ Contra-indications:           Latex allergy:  NO  Pacemaker:    NO  Contraindications for Manipulation: recent surgical history (relative)  Date of Surgery: 2024  Other:    Red Flags:  None    Suicide Screening:   The patient did not verbalize a primary behavioral concern, suicidal ideation, suicidal intent, or demonstrate suicidal behaviors.    Preferred Language for Healthcare:   [x] English       [] other:    SUBJECTIVE EXAMINATION     Patient stated complaint: Patient reports that L shoulder is feeling a bit better. He notes

## 2024-11-20 ENCOUNTER — TREATMENT (OUTPATIENT)
Dept: PHYSICAL THERAPY | Age: 64
End: 2024-11-20

## 2024-11-20 DIAGNOSIS — M25.612 DECREASED ROM OF LEFT SHOULDER: ICD-10-CM

## 2024-11-20 DIAGNOSIS — M25.512 LEFT SHOULDER PAIN, UNSPECIFIED CHRONICITY: Primary | ICD-10-CM

## 2024-11-20 DIAGNOSIS — M19.012 PRIMARY OSTEOARTHRITIS, LEFT SHOULDER: ICD-10-CM

## 2024-11-20 NOTE — PROGRESS NOTES
[] Not Met: [] Adjusted  2. Patient will have a decrease in pain to <3/10 to facilitate improvement in movement, function, and ADLs as indicated by Functional Deficits.  [x] Progressing: [] Met: [] Not Met: [] Adjusted    Long Term Goals: To be achieved in: 8 weeks  1. Disability index score of 20% or less for the Quick DASH to assist with reaching prior level of function with activities such as bathing, dressing, and sleeping.  [x] Progressing: [] Met: [] Not Met: [] Adjusted  2. Patient will demonstrate increased AROM of L shoulder flexion, abduction, ER, and IR to >160°, >160°, T2, and T10 respectively without pain to allow for proper joint functioning to enable patient to return to PLOF for ADLs.   [x] Progressing: [] Met: [] Not Met: [] Adjusted  3. Patient will demonstrate increased Strength of L shoulder flexion, abduction, ER, and IR to at least 4+/5 throughout without pain to allow for proper functional mobility to enable patient to return to PLOF for ADLs.   [x] Progressing: [] Met: [] Not Met: [] Adjusted  4. Patient will return to reaching into [place] cabinets with L UE to put away groceries and dishes so that he may return to PLOF for ADLs.   [x] Progressing: [] Met: [] Not Met: [] Adjusted  5. Patient will be able to sleep >6 hours without disruption secondary to L shoulder pain so that he may return to PLOF.  [x] Progressing: [] Met: [] Not Met: [] Adjusted     Overall Progression Towards Functional goals/ Treatment Progress Update:  [] Patient is progressing as expected towards functional goals listed.    [x] Progression is slowed due to complexities/Impairments listed.  [] Progression has been slowed due to co-morbidities.  [] Plan just implemented, too soon (<30days) to assess goals progression   [] Goals require adjustment due to lack of progress  [] Patient is not progressing as expected and requires additional follow up with physician  [] Other:     TREATMENT PLAN     Frequency/Duration: 1-2x/week

## 2024-11-21 ENCOUNTER — OFFICE VISIT (OUTPATIENT)
Dept: ORTHOPEDIC SURGERY | Age: 64
End: 2024-11-21

## 2024-11-21 VITALS — WEIGHT: 214 LBS | BODY MASS INDEX: 26.06 KG/M2 | HEIGHT: 76 IN

## 2024-11-21 DIAGNOSIS — M19.012 ARTHRITIS OF LEFT ACROMIOCLAVICULAR JOINT: ICD-10-CM

## 2024-11-21 DIAGNOSIS — M19.012 PRIMARY OSTEOARTHRITIS OF LEFT SHOULDER: ICD-10-CM

## 2024-11-21 DIAGNOSIS — Z98.890 S/P ARTHROSCOPY OF LEFT SHOULDER: Primary | ICD-10-CM

## 2024-11-21 PROCEDURE — 99024 POSTOP FOLLOW-UP VISIT: CPT | Performed by: ORTHOPAEDIC SURGERY

## 2024-11-21 RX ORDER — DAPAGLIFLOZIN 10 MG/1
10 TABLET, FILM COATED ORAL DAILY
COMMUNITY
Start: 2024-10-14

## 2024-11-22 NOTE — PROGRESS NOTES
Chief Complaint    Post-Op Check (LEFT SHOULDER)      History of Present Illness:  Facundo Hartley is a pleasant, 64 y.o., male, here today for follow up of his left shoulder. The patient is now 13 weeks out following a left arthroscopic revision subacromial decompression with acromioplasty; arthroscopic distal clavicle excision; injection of corticosteroid. He has continued in physical therapy at the [facility]. He reports no new injuries or setbacks.     Pain Assessment  Location of Pain: Shoulder  Location Modifiers: Left  Severity of Pain: 6  Quality of Pain: Throbbing, Sharp, Dull, Aching, Popping  Duration of Pain: Persistent  Frequency of Pain: Constant  Aggravating Factors: Other (Comment), Straightening, Stretching, Exercise  Limiting Behavior: Yes  Relieving Factors: Rest, Other (Comment)  Work-Related Injury: No  Are there other pain locations you wish to document?: No      Medical History:  Patient's medications, allergies, past medical, surgical, social and family histories were reviewed and updated as appropriate.    No notes on file    Review of Systems  A 14 point review of systems was completed by the patient and is available in the media section of the scanned medical record and was reviewed on 11/22/2024.  The review is negative with the exception of those things mentioned in the HPI and Past Medical History    Vital Signs:  There were no vitals filed for this visit.    General/Appearance: Alert and oriented and in no apparent distress.    Skin:  There are no skin lesions, cellulitis, or extreme edema. The patient has warm and well-perfused Bilateral upper extremities with brisk capillary refill.      Left Shoulder Exam:  Inspection:  No gross deformities, no signs of infection.    Palpation: Deferred    Active Range of Motion:  Forward Elevation 160, External Rotation 45, Internal Rotation T10 vs L1    Passive Range of Motion: Deferred    Strength:  External Rotation 4/5, Internal

## (undated) DEVICE — SOLUTION IV IRRIG 500ML 0.9% SODIUM CHL 2F7123

## (undated) DEVICE — SHOULDER STABILIZATION KIT,                                    DISPOSABLE 12 PER BOX

## (undated) DEVICE — WRAP COMPR W3INXL5YD NONSTERILE TAN SELF ADH COBAN

## (undated) DEVICE — SUTURE MCRYL SZ 4-0 L18IN ABSRB UD L19MM PS-2 3/8 CIR PRIM Y496G

## (undated) DEVICE — SUTURE MONOCRYL + SZ 4-0 L27IN ABSRB UD L19MM PS-2 3/8 CIR MCP426H

## (undated) DEVICE — NEEDLE HYPO 27GA L1.25IN GRY POLYPR HUB S STL REG BVL STR

## (undated) DEVICE — C-ARM: Brand: UNBRANDED

## (undated) DEVICE — SOLUTION IV 1000ML LAC RINGERS PH 6.5 INJ USP VIAFLX PLAS

## (undated) DEVICE — TOWEL,STOP FLAG GOLD N-W: Brand: MEDLINE

## (undated) DEVICE — SPONGE GZ W4XL8IN COT WVN 12 PLY

## (undated) DEVICE — 3M™ TEGADERM™ TRANSPARENT FILM DRESSING FRAME STYLE, 1624W, 2-3/8 IN X 2-3/4 IN (6 CM X 7 CM), 100/CT 4CT/CASE: Brand: 3M™ TEGADERM™

## (undated) DEVICE — SET GRAV VENT NVENT CK VLV 3 NDL FREE PRT 10 GTT

## (undated) DEVICE — SET ADMIN PRIMING 7ML L30IN 7.35LB 20 GTT 2ND RLER CLMP

## (undated) DEVICE — Device

## (undated) DEVICE — STANDARD HYPODERMIC NEEDLE,POLYPROPYLENE HUB: Brand: MONOJECT

## (undated) DEVICE — BLADE SAW SM W10XL18.5MM THK4MM OSC

## (undated) DEVICE — PROBE ABLAT XL 90DEG ASPIR BPLR RF 1 PC ELECTRD ERGO HNDL

## (undated) DEVICE — Z INACTIVE USE 2624224 SOLUTION IRRIG 3000ML LAC RINGERS ARTHROMATIC PLAS CONT

## (undated) DEVICE — PENCIL ES L3M BTTN SWCH S STL HEX LOK BLDE ELECTRD HOLSTER

## (undated) DEVICE — GOWN,SIRUS,POLYRNF,BRTHSLV,XL,30/CS: Brand: MEDLINE

## (undated) DEVICE — BUR SHAVER 5 MMX13 CM 8 FLUT OVL FOR AGGRESSIVE BNE COOLCUT

## (undated) DEVICE — CHLORAPREP 26ML ORANGE

## (undated) DEVICE — BANDAGE GZ W2XL75IN ST RAYON POLY CNFRM STRTCH LTWT

## (undated) DEVICE — ADHESIVE SKIN CLOSURE WND 8.661X1.5 IN 22 CM LIQUIBAND SECUR

## (undated) DEVICE — MAT FLR FLD ASPIR DMND

## (undated) DEVICE — GLOVE ORANGE PI 8   MSG9080

## (undated) DEVICE — BLADE SHV L13CM DIA5MM EXCALIBUR AGG COOLCUT

## (undated) DEVICE — GOWN,SIRUS,POLYRNF,SETINSLV,L,20/CS: Brand: MEDLINE

## (undated) DEVICE — CONVERTED USE 304929 SPONGE GAUZE CURITY 4X4IN 16-PLY ST

## (undated) DEVICE — 3M™ IOBAN™ 2 ANTIMICROBIAL INCISE DRAPE 6640EZ: Brand: IOBAN™ 2

## (undated) DEVICE — SUTURE PROL SZ 4-0 L18IN NONABSORBABLE BLU L13MM P-3 3/8 8699G

## (undated) DEVICE — GLOVE SURG SZ 65 L12IN FNGR THK94MIL STD WHT LTX FREE

## (undated) DEVICE — ELECTRODE ECG MONITR FOAM TEAR DROP ADLT RED

## (undated) DEVICE — UNDERGLOVE SURG SZ 8 BLU LTX FREE SYN POLYISOPRENE POLYMER

## (undated) DEVICE — MAT FLR W32XL58IN

## (undated) DEVICE — TUBING PMP L6FT CONT WAVE EXTN

## (undated) DEVICE — TUBING FLD MGMT Y DBL SPIK DUALWAVE

## (undated) DEVICE — ELECTRODE PT RET AD L9FT HI MOIST COND ADH HYDRGEL CORDED

## (undated) DEVICE — GOWN,SIRUS,NON REINFRCD,LARGE,SET IN SL: Brand: MEDLINE

## (undated) DEVICE — NEPTUNE E-SEP SMOKE EVACUATION PENCIL, COATED, 70MM BLADE, PUSH BUTTON SWITCH: Brand: NEPTUNE E-SEP

## (undated) DEVICE — INTENDED FOR TISSUE SEPARATION, AND OTHER PROCEDURES THAT REQUIRE A SHARP SURGICAL BLADE TO PUNCTURE OR CUT.: Brand: BARD-PARKER ® STAINLESS STEEL BLADES

## (undated) DEVICE — CATHETER IV 20GA L1.25IN PNK FEP SFTY STR HUB RADPQ DISP

## (undated) DEVICE — CANNULA ARTHSCP L7CM DIA7MM TRNSLUC THRD FLX W/ NO SQUIRT

## (undated) DEVICE — SUTURE VICRYL + SZ 3-0 L27IN ABSRB UD CT-2 L26MM 1/2 CIR TAPR VCP232H

## (undated) DEVICE — SHOULDER ARTHROSCOPY: Brand: MEDLINE INDUSTRIES, INC.

## (undated) DEVICE — 3M™ PRECISE™ MULTI-SHOT DS DISPOSABLE SKIN STAPLER, DS-15: Brand: 3M™ PRECISE™

## (undated) DEVICE — ZIMMER® STERILE DISPOSABLE TOURNIQUET CUFF WITH PLC, DUAL PORT, SINGLE BLADDER, 12 IN. (30 CM)

## (undated) DEVICE — KENDALL SCD EXPRESS SLEEVES, KNEE LENGTH, MEDIUM: Brand: KENDALL SCD

## (undated) DEVICE — PACK COOL L W14XL6.5IN 3 LAYR CONSTR SFT CLP CLSR 4 TIE

## (undated) DEVICE — GOWN,REINFRCE,POLY,ECLIPSE,SLV,3XLG: Brand: MEDLINE

## (undated) DEVICE — SUTURE VCRL SZ 3-0 L18IN ABSRB UD PS-2 L19MM 3/8 CRV PRIM J497H

## (undated) DEVICE — METER ACCU-CHEK INFORM BASE GLUCOSE

## (undated) DEVICE — SUTURE ABSORBABLE MONOFILAMENT 1-0 CT1 27 IN VIO PDS + PDP341H

## (undated) DEVICE — TUBING PMP L16FT MAIN DISP FOR AR-6400 AR-6475 Â€“ ORDER MULTIPLES OF 10 EACH